# Patient Record
Sex: FEMALE | Race: WHITE | NOT HISPANIC OR LATINO | Employment: UNEMPLOYED | URBAN - NONMETROPOLITAN AREA
[De-identification: names, ages, dates, MRNs, and addresses within clinical notes are randomized per-mention and may not be internally consistent; named-entity substitution may affect disease eponyms.]

---
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
## 2022-03-07 ENCOUNTER — APPOINTMENT (OUTPATIENT)
Dept: GENERAL RADIOLOGY | Facility: OTHER | Age: 22
End: 2022-03-07
Attending: PHYSICIAN ASSISTANT
Payer: COMMERCIAL

## 2022-03-07 ENCOUNTER — HOSPITAL ENCOUNTER (EMERGENCY)
Facility: OTHER | Age: 22
Discharge: HOME | End: 2022-03-07
Attending: PHYSICIAN ASSISTANT | Admitting: PHYSICIAN ASSISTANT
Payer: COMMERCIAL

## 2022-03-07 ENCOUNTER — HOSPITAL ENCOUNTER (EMERGENCY)
Facility: OTHER | Age: 22
End: 2022-03-07

## 2022-03-07 VITALS
BODY MASS INDEX: 38.98 KG/M2 | WEIGHT: 220 LBS | HEART RATE: 96 BPM | DIASTOLIC BLOOD PRESSURE: 73 MMHG | SYSTOLIC BLOOD PRESSURE: 131 MMHG | OXYGEN SATURATION: 97 % | HEIGHT: 63 IN | RESPIRATION RATE: 21 BRPM | TEMPERATURE: 99.1 F

## 2022-03-07 DIAGNOSIS — G40.A09: ICD-10-CM

## 2022-03-07 DIAGNOSIS — R56.9 PARTIAL SEIZURE (H): ICD-10-CM

## 2022-03-07 DIAGNOSIS — R79.89 ELEVATED LFTS: ICD-10-CM

## 2022-03-07 DIAGNOSIS — F41.0 ANXIETY ATTACK: ICD-10-CM

## 2022-03-07 DIAGNOSIS — E86.0 DEHYDRATION: ICD-10-CM

## 2022-03-07 LAB
ALBUMIN SERPL-MCNC: 4.8 G/DL (ref 3.5–5.7)
ALBUMIN UR-MCNC: 70 MG/DL
ALP SERPL-CCNC: 44 U/L (ref 34–104)
ALT SERPL W P-5'-P-CCNC: 119 U/L (ref 7–52)
ANION GAP SERPL CALCULATED.3IONS-SCNC: 12 MMOL/L (ref 3–14)
APPEARANCE UR: CLEAR
AST SERPL W P-5'-P-CCNC: 55 U/L (ref 13–39)
BASOPHILS # BLD AUTO: 0.1 10E3/UL (ref 0–0.2)
BASOPHILS NFR BLD AUTO: 1 %
BILIRUB SERPL-MCNC: 0.3 MG/DL (ref 0.3–1)
BILIRUB UR QL STRIP: NEGATIVE
BUN SERPL-MCNC: 13 MG/DL (ref 7–25)
CALCIUM SERPL-MCNC: 10.4 MG/DL (ref 8.6–10.3)
CHLORIDE BLD-SCNC: 98 MMOL/L (ref 98–107)
CO2 SERPL-SCNC: 29 MMOL/L (ref 21–31)
COLOR UR AUTO: ABNORMAL
CREAT SERPL-MCNC: 0.89 MG/DL (ref 0.6–1.2)
D DIMER PPP FEU-MCNC: <=0.27 UG/ML FEU (ref 0–0.5)
EOSINOPHIL # BLD AUTO: 0.4 10E3/UL (ref 0–0.7)
EOSINOPHIL NFR BLD AUTO: 4 %
ERYTHROCYTE [DISTWIDTH] IN BLOOD BY AUTOMATED COUNT: 13.1 % (ref 10–15)
FLUAV RNA SPEC QL NAA+PROBE: NEGATIVE
FLUBV RNA RESP QL NAA+PROBE: NEGATIVE
GFR SERPL CREATININE-BSD FRML MDRD: >90 ML/MIN/1.73M2
GLUCOSE BLD-MCNC: 185 MG/DL (ref 70–105)
GLUCOSE UR STRIP-MCNC: 70 MG/DL
HCG SERPL QL: NEGATIVE
HCT VFR BLD AUTO: 43.9 % (ref 35–47)
HGB BLD-MCNC: 14.5 G/DL (ref 11.7–15.7)
HGB UR QL STRIP: NEGATIVE
IMM GRANULOCYTES # BLD: 0.2 10E3/UL
IMM GRANULOCYTES NFR BLD: 2 %
KETONES UR STRIP-MCNC: NEGATIVE MG/DL
LACTATE SERPL-SCNC: 2.5 MMOL/L (ref 0.7–2)
LEUKOCYTE ESTERASE UR QL STRIP: NEGATIVE
LIPASE SERPL-CCNC: 41 U/L (ref 11–82)
LYMPHOCYTES # BLD AUTO: 3.2 10E3/UL (ref 0.8–5.3)
LYMPHOCYTES NFR BLD AUTO: 30 %
MCH RBC QN AUTO: 29.3 PG (ref 26.5–33)
MCHC RBC AUTO-ENTMCNC: 33 G/DL (ref 31.5–36.5)
MCV RBC AUTO: 89 FL (ref 78–100)
MONOCYTES # BLD AUTO: 0.7 10E3/UL (ref 0–1.3)
MONOCYTES NFR BLD AUTO: 7 %
MUCOUS THREADS #/AREA URNS LPF: PRESENT /LPF
NEUTROPHILS # BLD AUTO: 6 10E3/UL (ref 1.6–8.3)
NEUTROPHILS NFR BLD AUTO: 56 %
NITRATE UR QL: NEGATIVE
NRBC # BLD AUTO: 0 10E3/UL
NRBC BLD AUTO-RTO: 0 /100
PH UR STRIP: 6.5 [PH] (ref 5–9)
PLATELET # BLD AUTO: 284 10E3/UL (ref 150–450)
POTASSIUM BLD-SCNC: 4.1 MMOL/L (ref 3.5–5.1)
PROCALCITONIN SERPL-MCNC: 0.93 NG/ML
PROT SERPL-MCNC: 7.8 G/DL (ref 6.4–8.9)
RBC # BLD AUTO: 4.95 10E6/UL (ref 3.8–5.2)
RBC URINE: 1 /HPF
RSV RNA SPEC NAA+PROBE: NEGATIVE
SARS-COV-2 RNA RESP QL NAA+PROBE: NEGATIVE
SODIUM SERPL-SCNC: 139 MMOL/L (ref 134–144)
SP GR UR STRIP: 1.01 (ref 1–1.03)
TROPONIN I SERPL-MCNC: 4.8 PG/ML (ref 0–34)
UROBILINOGEN UR STRIP-MCNC: NORMAL MG/DL
WBC # BLD AUTO: 10.6 10E3/UL (ref 4–11)
WBC URINE: 3 /HPF

## 2022-03-07 PROCEDURE — 96360 HYDRATION IV INFUSION INIT: CPT | Performed by: PHYSICIAN ASSISTANT

## 2022-03-07 PROCEDURE — 36415 COLL VENOUS BLD VENIPUNCTURE: CPT | Performed by: PHYSICIAN ASSISTANT

## 2022-03-07 PROCEDURE — 99283 EMERGENCY DEPT VISIT LOW MDM: CPT | Performed by: PHYSICIAN ASSISTANT

## 2022-03-07 PROCEDURE — 93005 ELECTROCARDIOGRAM TRACING: CPT | Performed by: PHYSICIAN ASSISTANT

## 2022-03-07 PROCEDURE — 84703 CHORIONIC GONADOTROPIN ASSAY: CPT | Performed by: PHYSICIAN ASSISTANT

## 2022-03-07 PROCEDURE — 80175 DRUG SCREEN QUAN LAMOTRIGINE: CPT | Performed by: PHYSICIAN ASSISTANT

## 2022-03-07 PROCEDURE — 83690 ASSAY OF LIPASE: CPT | Performed by: PHYSICIAN ASSISTANT

## 2022-03-07 PROCEDURE — 81001 URINALYSIS AUTO W/SCOPE: CPT | Performed by: PHYSICIAN ASSISTANT

## 2022-03-07 PROCEDURE — 93010 ELECTROCARDIOGRAM REPORT: CPT | Performed by: INTERNAL MEDICINE

## 2022-03-07 PROCEDURE — 99285 EMERGENCY DEPT VISIT HI MDM: CPT | Mod: 25 | Performed by: PHYSICIAN ASSISTANT

## 2022-03-07 PROCEDURE — 71045 X-RAY EXAM CHEST 1 VIEW: CPT

## 2022-03-07 PROCEDURE — 87637 SARSCOV2&INF A&B&RSV AMP PRB: CPT | Performed by: PHYSICIAN ASSISTANT

## 2022-03-07 PROCEDURE — 84484 ASSAY OF TROPONIN QUANT: CPT | Performed by: PHYSICIAN ASSISTANT

## 2022-03-07 PROCEDURE — 258N000003 HC RX IP 258 OP 636: Performed by: PHYSICIAN ASSISTANT

## 2022-03-07 PROCEDURE — 83605 ASSAY OF LACTIC ACID: CPT | Performed by: PHYSICIAN ASSISTANT

## 2022-03-07 PROCEDURE — 85025 COMPLETE CBC W/AUTO DIFF WBC: CPT | Performed by: PHYSICIAN ASSISTANT

## 2022-03-07 PROCEDURE — C9803 HOPD COVID-19 SPEC COLLECT: HCPCS | Performed by: PHYSICIAN ASSISTANT

## 2022-03-07 PROCEDURE — 80053 COMPREHEN METABOLIC PANEL: CPT | Performed by: PHYSICIAN ASSISTANT

## 2022-03-07 PROCEDURE — 84145 PROCALCITONIN (PCT): CPT | Performed by: PHYSICIAN ASSISTANT

## 2022-03-07 PROCEDURE — 85379 FIBRIN DEGRADATION QUANT: CPT | Performed by: PHYSICIAN ASSISTANT

## 2022-03-07 RX ORDER — LAMOTRIGINE 25 MG/1
25 TABLET ORAL DAILY
COMMUNITY

## 2022-03-07 RX ORDER — FLUOXETINE 40 MG/1
60 CAPSULE ORAL DAILY
COMMUNITY

## 2022-03-07 RX ORDER — SODIUM CHLORIDE 9 MG/ML
INJECTION, SOLUTION INTRAVENOUS CONTINUOUS
Status: DISCONTINUED | OUTPATIENT
Start: 2022-03-07 | End: 2022-03-07 | Stop reason: HOSPADM

## 2022-03-07 RX ADMIN — SODIUM CHLORIDE 1000 ML: 9 INJECTION, SOLUTION INTRAVENOUS at 16:48

## 2022-03-07 ASSESSMENT — ENCOUNTER SYMPTOMS
TREMORS: 0
FLANK PAIN: 0
SEIZURES: 0
NAUSEA: 0
SORE THROAT: 0
EYE PAIN: 0
CONFUSION: 1
AGITATION: 0
FEVER: 1
VOMITING: 0
FACIAL SWELLING: 0
ABDOMINAL PAIN: 0
STRIDOR: 0
WHEEZING: 0
BACK PAIN: 0
NECK PAIN: 0

## 2022-03-07 NOTE — ED TRIAGE NOTES
EMS Arrival Note  ________________________________  Verna Kauffman is a 21 year old Female that arrives via [facility]  Pre hospital clinical presentation per EMS personnel includes feels like passed out has hx of panic attacks.  Pre hospital personnel report vital signs of:  B/P 167/101; HR 70, SpO2 98  Pre Hospital Cardiac rhythm reported as Normal Sinus  Patient arrives with:  GCS Total = 15  Airway intact  Breathing Assessment Normal  Circulation Assessment Normal      Placed in room 906, gowned, warm blanket provided, side rails up,  ID verified and band placed, and call light within reach.       Previous living situation Parents/Siblings

## 2022-03-07 NOTE — ED PROVIDER NOTES
"  History     Chief Complaint   Patient presents with     Anxiety     HPI  Verna Kauffman is a 21 year old female who has a history of panic attacks in the past.  She is brought in [facility] due to an episode of decreased mental status and confusion.  Her mother reports she was just staring blankly.  The patient states she \"just does not feel right\".  She is here for further evaluation.  Denies having her COVID vaccine.  She is noted to have a slight temperature at 100.2.    Allergies:  Allergies   Allergen Reactions     Depakote [Valproic Acid]      Makes her to sedate       Problem List:    Patient Active Problem List    Diagnosis Date Noted     Intellectual disability IQ 62 07/13/2019     Priority: Medium     Neuropsychological evaluation total scale IQ 62 from 2009.       Hallucinations 07/12/2019     Priority: Medium     Auditory hallucination 10/18/2018     Priority: Medium     Major depressive disorder, single episode, severe with mood-congruent psychotic features (H) 10/18/2018     Priority: Medium     Suicidal ideation 10/18/2018     Priority: Medium     Attention deficit hyperactivity disorder (ADHD), combined type 02/26/2018     Priority: Medium     Generalized anxiety disorder 01/28/2013     Priority: Medium     Learning disability 10/18/2012     Priority: Medium     Premature birth 10/18/2012     Priority: Medium     Overview:   Born at 23 6/7 weeks gestation, weight 1 pound 1 ounce       Separation anxiety disorder 11/14/2011     Priority: Medium     Chronic rhinitis 07/23/2008     Priority: Medium        Past Medical History:    Past Medical History:   Diagnosis Date     Anxiety        Past Surgical History:    Past Surgical History:   Procedure Laterality Date     CARDIAC SURGERY  2000     HC REMOVE TONSILS/ADENOIDS,<13 Y/O  12/26/11       Family History:    Family History   Problem Relation Age of Onset     Hypertension Paternal Grandfather      Neurologic Disorder Mother         ADHD     " "Depression Other         grandmother     Neurologic Disorder Father         learning problems       Social History:  Marital Status:  Single [1]  Social History     Tobacco Use     Smoking status: Smoker, Current Status Unknown     Packs/day: 0.00     Smokeless tobacco: Never Used     Tobacco comment: vape   Substance Use Topics     Alcohol use: Yes     Drug use: Not Currently     Types: Marijuana        Medications:    cariprazine (VRAYLAR) 1.5 MG CAPS capsule  clonazePAM (KLONOPIN) 0.25 MG TBDP ODT tab  FLUoxetine (PROZAC) 40 MG capsule  lamoTRIgine (LAMICTAL) 25 MG tablet  metFORMIN (GLUCOPHAGE) 500 MG tablet          Review of Systems   Constitutional: Positive for fever.   HENT: Negative for facial swelling and sore throat.    Eyes: Negative for pain.   Respiratory: Negative for wheezing and stridor.    Cardiovascular: Negative for chest pain.   Gastrointestinal: Negative for abdominal pain, nausea and vomiting.   Genitourinary: Negative for flank pain.   Musculoskeletal: Negative for back pain and neck pain.   Skin: Negative for pallor.   Neurological: Negative for tremors and seizures.   Psychiatric/Behavioral: Positive for confusion. Negative for agitation.   All other systems reviewed and are negative.      Physical Exam   BP: (!) 158/108  Pulse: 112  Temp: 100.2  F (37.9  C)  Resp: 19  Height: 160 cm (5' 3\")  Weight: 99.8 kg (220 lb)  SpO2: 98 %    Vitals:    03/07/22 1700 03/07/22 1715 03/07/22 1730 03/07/22 1834   BP: (!) 149/74 131/73     Pulse: 98 94 96    Resp: 17 22 21    Temp:    99.1  F (37.3  C)   TempSrc:    Tympanic   SpO2: 96% 95% 97%    Weight:       Height:           Physical Exam  Vitals and nursing note reviewed.   Constitutional:       General: She is not in acute distress.     Appearance: Normal appearance. She is not ill-appearing or toxic-appearing.   HENT:      Head: Normocephalic. No raccoon eyes, right periorbital erythema or left periorbital erythema.      Right Ear: No drainage or " tenderness.      Left Ear: No drainage or tenderness.      Nose: Nose normal.   Eyes:      General: Lids are normal. Gaze aligned appropriately. No scleral icterus.     Extraocular Movements: Extraocular movements intact.   Neck:      Trachea: No tracheal deviation.   Cardiovascular:      Rate and Rhythm: Normal rate.   Pulmonary:      Effort: Pulmonary effort is normal. No respiratory distress.      Breath sounds: No stridor.      Comments: Lung sounds are clear SaO2 is 98% on room air.  She does not appear to be in respiratory distress.  No tachypnea.  Abdominal:      Tenderness: There is no abdominal tenderness.   Musculoskeletal:         General: No deformity or signs of injury. Normal range of motion.      Cervical back: Normal range of motion. No signs of trauma.   Skin:     General: Skin is warm and dry.      Coloration: Skin is not jaundiced or pale.   Neurological:      General: No focal deficit present.      Mental Status: She is alert and oriented to person, place, and time.      GCS: GCS eye subscore is 4. GCS verbal subscore is 5. GCS motor subscore is 6.      Motor: No tremor or seizure activity.   Psychiatric:         Attention and Perception: Attention normal.         Mood and Affect: Mood normal.         ED Course     EKG shows sinus tachycardia with a heart rate of 105.  The Lactic acid level is elevated due to partial siezure, at this time there is no sign of severe sepsis or septic shock.         Results for orders placed or performed during the hospital encounter of 03/07/22 (from the past 24 hour(s))   CBC with platelets differential    Narrative    The following orders were created for panel order CBC with platelets differential.  Procedure                               Abnormality         Status                     ---------                               -----------         ------                     CBC with platelets and d...[[id number]]                      Final result                  Please view results for these tests on the individual orders.   D dimer quantitative   Result Value Ref Range    D-Dimer Quantitative <=0.27 0.00 - 0.50 ug/mL FEU    Narrative    This D-dimer assay is intended for use in conjunction with a clinical pretest probability assessment model to exclude pulmonary embolism (PE) and deep venous thrombosis (DVT) in outpatients suspected of PE or DVT. The cut-off value is 0.50 ug/mL FEU.   Comprehensive metabolic panel   Result Value Ref Range    Sodium 139 134 - 144 mmol/L    Potassium 4.1 3.5 - 5.1 mmol/L    Chloride 98 98 - 107 mmol/L    Carbon Dioxide (CO2) 29 21 - 31 mmol/L    Anion Gap 12 3 - 14 mmol/L    Urea Nitrogen 13 7 - 25 mg/dL    Creatinine 0.89 0.60 - 1.20 mg/dL    Calcium 10.4 (H) 8.6 - 10.3 mg/dL    Glucose 185 (H) 70 - 105 mg/dL    Alkaline Phosphatase 44 34 - 104 U/L    AST 55 (H) 13 - 39 U/L     (H) 7 - 52 U/L    Protein Total 7.8 6.4 - 8.9 g/dL    Albumin 4.8 3.5 - 5.7 g/dL    Bilirubin Total 0.3 0.3 - 1.0 mg/dL    GFR Estimate >90 >60 mL/min/1.73m2   Lactic acid whole blood   Result Value Ref Range    Lactic Acid 2.5 (H) 0.7 - 2.0 mmol/L   Lipase   Result Value Ref Range    Lipase 41 11 - 82 U/L   Troponin I   Result Value Ref Range    Troponin I 4.8 0.0 - 34.0 pg/mL   CBC with platelets and differential   Result Value Ref Range    WBC Count 10.6 4.0 - 11.0 10e3/uL    RBC Count 4.95 3.80 - 5.20 10e6/uL    Hemoglobin 14.5 11.7 - 15.7 g/dL    Hematocrit 43.9 35.0 - 47.0 %    MCV 89 78 - 100 fL    MCH 29.3 26.5 - 33.0 pg    MCHC 33.0 31.5 - 36.5 g/dL    RDW 13.1 10.0 - 15.0 %    Platelet Count 284 150 - 450 10e3/uL    % Neutrophils 56 %    % Lymphocytes 30 %    % Monocytes 7 %    % Eosinophils 4 %    % Basophils 1 %    % Immature Granulocytes 2 %    NRBCs per 100 WBC 0 <1 /100    Absolute Neutrophils 6.0 1.6 - 8.3 10e3/uL    Absolute Lymphocytes 3.2 0.8 - 5.3 10e3/uL    Absolute Monocytes 0.7 0.0 - 1.3 10e3/uL    Absolute Eosinophils 0.4 0.0 - 0.7  10e3/uL    Absolute Basophils 0.1 0.0 - 0.2 10e3/uL    Absolute Immature Granulocytes 0.2 <=0.4 10e3/uL    Absolute NRBCs 0.0 10e3/uL   Procalcitonin   Result Value Ref Range    Procalcitonin 0.93 <0.50 ng/mL ng/mL   HCG qualitative Blood   Result Value Ref Range    hCG Serum Qualitative Negative Negative   Asymptomatic Influenza A/B & SARS-CoV2 (COVID-19) Virus PCR Multiplex Nose    Specimen: Nose; Swab   Result Value Ref Range    Influenza A PCR Negative Negative    Influenza B PCR Negative Negative    RSV PCR Negative Negative    SARS CoV2 PCR Negative Negative    Narrative    Testing was performed using the Xpert Xpress CoV2/Flu/RSV Assay on the Cepheid GeneXpert Instrument. This test should be ordered for the detection of SARS-CoV-2 and influenza viruses in individuals who meet clinical and/or epidemiological criteria. Test performance is unknown in asymptomatic patients. This test is for in vitro diagnostic use under the FDA EUA for laboratories certified under CLIA to perform high or moderate complexity testing. This test has not been FDA cleared or approved. A negative result does not rule out the presence of PCR inhibitors in the specimen or target RNA in concentration below the limit of detection for the assay. If only one viral target is positive but coinfection with multiple targets is suspected, the sample should be re-tested with another FDA cleared, approved, or authorized test, if coinfection would change clinical management. This test was validated by the [facility] [employer]. These laboratories are certified under the Clinical  Laboratory Improvement Amendments of 1988 (CLIA-88) as qualified to perform high complexity laboratory testing.   XR Chest Port 1 View    Narrative    Procedure:XR CHEST PORT 1 VIEW    Clinical history:Female, 21 years, fever    Technique: Single view was obtained.    Comparison: 2/14/2021    Findings: The cardiac silhouette is normal. The pulmonary vasculature  is  normal.    The lungs are clear. Bony structures are unremarkable.      Impression    Impression:   No acute abnormality. No evidence of acute or active cardiopulmonary  disease.    JOVITA BURDICK MD         SYSTEM ID:  [id number]   UA with Microscopic reflex to Culture    Specimen: Urine, Clean Catch   Result Value Ref Range    Color Urine Light Yellow Colorless, Straw, Light Yellow, Yellow    Appearance Urine Clear Clear    Glucose Urine 70  (A) Negative mg/dL    Bilirubin Urine Negative Negative    Ketones Urine Negative Negative mg/dL    Specific Gravity Urine 1.014 1.000 - 1.030    Blood Urine Negative Negative    pH Urine 6.5 5.0 - 9.0    Protein Albumin Urine 70  (A) Negative mg/dL    Urobilinogen Urine Normal Normal, 2.0 mg/dL    Nitrite Urine Negative Negative    Leukocyte Esterase Urine Negative Negative    Mucus Urine Present (A) None Seen /LPF    RBC Urine 1 <=2 /HPF    WBC Urine 3 <=5 /HPF    Narrative    Urine Culture not indicated       Medications   0.9% sodium chloride BOLUS (0 mLs Intravenous Stopped 3/7/22 1746)     Followed by   sodium chloride 0.9% infusion (has no administration in time range)       Assessments & Plan (with Medical Decision Making)     I have reviewed the nursing notes.    I have reviewed the findings, diagnosis, plan and need for follow up with the patient.      Discharge Medication List as of 3/7/2022  6:25 PM          Final diagnoses:   Petit mal, typical (H)   Partial seizure (H)   Dehydration   Elevated LFTs   Anxiety attack     Afebrile.  Vital signs stable.  Patient with an episode of blank staring and confusion.  Reports she just did not feel herself.  IV established and she was given fluids. EKG shows sinus tachycardia with a heart rate of 105.  Troponin is normal.  D-dimer is normal.  CBC shows normal white blood cells no left shift.  The patient's hemoglobin is 14.5.  CMP shows elevated ALT of 119, AST is 55 but alk phos remains normal at 44.  Lipase is normal.  Her  UA shows no signs of UTI.  hCG is negative.  Procalcitonin is elevated as well at 0.93.  Lactic acid is slightly elevated 2.5.  Patient's Lamictal level is pending.  The patient is feeling much better with better treatment.  Given this patient's elevated lactic acid as well as procalcitonin no other reasoning for these elevations most likely a petit mall seizure or partial seizure.  Furthermore she may have been slightly dehydrated.  I discussed increase fluid intake and continued monitoring she feels ready to return home.  The patient and her mother report being happy with the care they received today.  Follow-up if there is any concerns for further evaluation as needed.  I discussed this case with Dr. Dickens who agrees with the evaluation, diagnosis and treatment of this patient.  3/7/2022   [facility] [place]     Ronaldo Castillo PA-C  03/07/22 2028       Ronaldo Castillo PA-C  03/07/22 2049

## 2022-03-08 LAB
ATRIAL RATE - MUSE: 105 BPM
DIASTOLIC BLOOD PRESSURE - MUSE: NORMAL MMHG
INTERPRETATION ECG - MUSE: NORMAL
P AXIS - MUSE: 51 DEGREES
PR INTERVAL - MUSE: 142 MS
QRS DURATION - MUSE: 92 MS
QT - MUSE: 346 MS
QTC - MUSE: 457 MS
R AXIS - MUSE: 6 DEGREES
SYSTOLIC BLOOD PRESSURE - MUSE: NORMAL MMHG
T AXIS - MUSE: 22 DEGREES
VENTRICULAR RATE- MUSE: 105 BPM

## 2022-03-11 ENCOUNTER — TELEPHONE (OUTPATIENT)
Dept: EMERGENCY MEDICINE | Facility: OTHER | Age: 22
End: 2022-03-11
Payer: COMMERCIAL

## 2022-03-11 LAB — LAMOTRIGINE SERPL-MCNC: <0.9 UG/ML

## 2022-03-11 NOTE — TELEPHONE ENCOUNTER
[facility] () Emergency Department/Urgent Care Lab result notification:    Reason for call  Notify of lab results, assess symptoms,  review ED providers recommendations (if necessary) and advise per ED lab result f/u protocol.    Lab result  Final Lamotrigine (Lamictal) level is <0.9 ug/mL and this level is [LOW ]. Normal reference ranged for Lamotrigine (Lamictal) is 3.0 to 15.0 ug/mL  Resulted after [facility] Emergency Dept visit on 3/7/22 (date).  Patient to be notified of result and advised to relay result to their Neurologist or physician who manages the medication dosing.    Left voicemail message requesting a call back to [phone] between 9 a.m. and 5:30 p.m. for patient's ED/UC lab results.      Eve Washington RN  Customer Service Center Result RN  [facility] Emergency Dept Lab Result RN  # [phone]

## 2022-03-12 NOTE — TELEPHONE ENCOUNTER
[facility] () Emergency Department/Urgent Care Lab result notification:     Reason for call  Notify of lab results, assess symptoms,  review ED providers recommendations (if necessary) and advise per ED lab result f/u protocol.     Lab result  Final Lamotrigine (Lamictal) level is <0.9 ug/mL and this level is [LOW ]. Normal reference ranged for Lamotrigine (Lamictal) is 3.0 to 15.0 ug/mL  Resulted after [facility] Emergency Dept visit on 3/7/22 (date).  Patient to be notified of result and advised to relay result to their Neurologist or physician who manages the medication dosing.         1:30 Relayed result to patient and she will contact her neurologist    Leidy Akbar  [facility]  Emergency Dept Lab Result RN  # [phone]

## 2022-09-04 ENCOUNTER — OFFICE VISIT (OUTPATIENT)
Dept: FAMILY MEDICINE | Facility: OTHER | Age: 22
End: 2022-09-04
Attending: FAMILY MEDICINE
Payer: COMMERCIAL

## 2022-09-04 VITALS
WEIGHT: 228.19 LBS | OXYGEN SATURATION: 98 % | DIASTOLIC BLOOD PRESSURE: 76 MMHG | RESPIRATION RATE: 18 BRPM | BODY MASS INDEX: 40.42 KG/M2 | HEART RATE: 99 BPM | SYSTOLIC BLOOD PRESSURE: 134 MMHG | TEMPERATURE: 97.2 F

## 2022-09-04 DIAGNOSIS — F31.9 BIPOLAR DISORDER WITH PSYCHOTIC FEATURES (H): ICD-10-CM

## 2022-09-04 DIAGNOSIS — G47.00 INSOMNIA, UNSPECIFIED TYPE: Primary | ICD-10-CM

## 2022-09-04 PROCEDURE — 99203 OFFICE O/P NEW LOW 30 MIN: CPT | Performed by: FAMILY MEDICINE

## 2022-09-04 RX ORDER — UREA 20 %
CREAM (GRAM) TOPICAL
COMMUNITY
Start: 2022-08-01

## 2022-09-04 RX ORDER — TRAZODONE HYDROCHLORIDE 50 MG/1
TABLET, FILM COATED ORAL
COMMUNITY
Start: 2022-08-31 | End: 2022-09-04

## 2022-09-04 RX ORDER — CARIPRAZINE 4.5 MG/1
CAPSULE, GELATIN COATED ORAL
COMMUNITY
Start: 2022-09-01

## 2022-09-04 RX ORDER — EXENATIDE 2 MG/.85ML
INJECTION, SUSPENSION, EXTENDED RELEASE SUBCUTANEOUS
COMMUNITY
Start: 2022-08-01

## 2022-09-04 RX ORDER — LORAZEPAM 2 MG/1
2 TABLET ORAL EVERY 6 HOURS PRN
COMMUNITY
Start: 2022-09-04

## 2022-09-04 ASSESSMENT — PAIN SCALES - GENERAL: PAINLEVEL: NO PAIN (0)

## 2022-09-04 NOTE — NURSING NOTE
"Patient presents to clinic with her mother experiencing insomnia, trouble falling asleep and jerking body movements while sleeping.  She started Trazodone 50mg 3 days ago andstates, \"I hear voices, feel funny and have weird thoughts.\"    Medication Rec Complete  Diamante Castillo LPN............9/4/2022 9:48 AM    "

## 2022-09-04 NOTE — PROGRESS NOTES
"  Assessment & Plan       ICD-10-CM    1. Insomnia, unspecified type  G47.00    2. Bipolar disorder with psychotic features (H)  F31.9      Stop trazodone. Use ativan tonight and tomorrow night before bed (okay to take up to 2 2mg tabs). Follow up with psychiatric medication provider on 9/6/22 to discuss plan.        BMI:   Estimated body mass index is 40.42 kg/m  as calculated from the following:    Height as of 3/7/22: 1.6 m (5' 3\").    Weight as of this encounter: 103.5 kg (228 lb 3 oz).       No follow-ups on file.    [facility] NURSE  [facility]    Angelica Gillespie is a 22 year old, presenting for the following health issues:  Insomnia (Trouble falling asleep, awake all night, weird thoughts, jerking extremities )      HPI       Patient presents with worsening insomnia since starting trazadone. Seemed to induce a manic episode with involuntary movements. Hx of psychosis, hearing voices.     Took ativan 4 mg last night about 2am.     On prozac x 1 year.   Lamictal for years.   Ativan x years.   Vraylar x 4-5y    trazadone 50mg for the past 3 nights  Not sleeping manic, \"scary and goofy\"            Objective    /76 (BP Location: Right arm, Patient Position: Sitting, Cuff Size: Adult Large)   Pulse 99   Temp 97.2  F (36.2  C) (Tympanic)   Resp 18   Wt 103.5 kg (228 lb 3 oz)   LMP  (LMP Unknown)   SpO2 98%   Breastfeeding No   BMI 40.42 kg/m    Body mass index is 40.42 kg/m .  Physical Exam  Constitutional:       Appearance: She is well-developed.   HENT:      Right Ear: External ear normal.      Left Ear: External ear normal.   Eyes:      General: No scleral icterus.     Conjunctiva/sclera: Conjunctivae normal.   Cardiovascular:      Rate and Rhythm: Normal rate.   Pulmonary:      Effort: Pulmonary effort is normal. No respiratory distress.   Skin:     Findings: No rash.   Neurological:      Mental Status: She is alert.            "

## 2022-09-12 ENCOUNTER — HOSPITAL ENCOUNTER (EMERGENCY)
Facility: OTHER | Age: 22
Discharge: HOME | End: 2022-09-12
Attending: FAMILY MEDICINE | Admitting: FAMILY MEDICINE
Payer: COMMERCIAL

## 2022-09-12 VITALS
RESPIRATION RATE: 16 BRPM | TEMPERATURE: 97.2 F | WEIGHT: 228 LBS | BODY MASS INDEX: 40.4 KG/M2 | DIASTOLIC BLOOD PRESSURE: 102 MMHG | OXYGEN SATURATION: 96 % | SYSTOLIC BLOOD PRESSURE: 158 MMHG | HEIGHT: 63 IN | HEART RATE: 120 BPM

## 2022-09-12 DIAGNOSIS — R44.0 AUDITORY HALLUCINATION: ICD-10-CM

## 2022-09-12 DIAGNOSIS — F32.3 MAJOR DEPRESSIVE DISORDER, SINGLE EPISODE, SEVERE WITH MOOD-CONGRUENT PSYCHOTIC FEATURES (H): ICD-10-CM

## 2022-09-12 LAB
ALBUMIN UR-MCNC: 100 MG/DL
AMPHETAMINES UR QL: NOT DETECTED
ANION GAP SERPL CALCULATED.3IONS-SCNC: 9 MMOL/L (ref 3–14)
APAP SERPL-MCNC: <2 MG/L (ref 10–30)
APPEARANCE UR: CLEAR
BARBITURATES UR QL SCN: NOT DETECTED
BASOPHILS # BLD AUTO: 0.1 10E3/UL (ref 0–0.2)
BASOPHILS NFR BLD AUTO: 1 %
BENZODIAZ UR QL SCN: NOT DETECTED
BILIRUB UR QL STRIP: NEGATIVE
BUN SERPL-MCNC: 14 MG/DL (ref 7–25)
BUPRENORPHINE UR QL: NOT DETECTED
CALCIUM SERPL-MCNC: 10.1 MG/DL (ref 8.6–10.3)
CANNABINOIDS UR QL: NOT DETECTED
CHLORIDE BLD-SCNC: 99 MMOL/L (ref 98–107)
CO2 SERPL-SCNC: 25 MMOL/L (ref 21–31)
COCAINE UR QL SCN: NOT DETECTED
COLOR UR AUTO: YELLOW
CREAT SERPL-MCNC: 0.85 MG/DL (ref 0.6–1.2)
D-METHAMPHET UR QL: NOT DETECTED
EOSINOPHIL # BLD AUTO: 0.6 10E3/UL (ref 0–0.7)
EOSINOPHIL NFR BLD AUTO: 5 %
ERYTHROCYTE [DISTWIDTH] IN BLOOD BY AUTOMATED COUNT: 12.8 % (ref 10–15)
ETHANOL SERPL-MCNC: <0.01 G/DL
FLUAV RNA SPEC QL NAA+PROBE: NEGATIVE
FLUBV RNA RESP QL NAA+PROBE: NEGATIVE
GFR SERPL CREATININE-BSD FRML MDRD: >90 ML/MIN/1.73M2
GLUCOSE BLD-MCNC: 212 MG/DL (ref 70–105)
GLUCOSE UR STRIP-MCNC: 150 MG/DL
HCG UR QL: NEGATIVE
HCT VFR BLD AUTO: 42.2 % (ref 35–47)
HGB BLD-MCNC: 14.4 G/DL (ref 11.7–15.7)
HGB UR QL STRIP: NEGATIVE
HOLD SPECIMEN: NORMAL
IMM GRANULOCYTES # BLD: 0.1 10E3/UL
IMM GRANULOCYTES NFR BLD: 1 %
KETONES UR STRIP-MCNC: NEGATIVE MG/DL
LEUKOCYTE ESTERASE UR QL STRIP: NEGATIVE
LYMPHOCYTES # BLD AUTO: 4.4 10E3/UL (ref 0.8–5.3)
LYMPHOCYTES NFR BLD AUTO: 38 %
MCH RBC QN AUTO: 28.6 PG (ref 26.5–33)
MCHC RBC AUTO-ENTMCNC: 34.1 G/DL (ref 31.5–36.5)
MCV RBC AUTO: 84 FL (ref 78–100)
METHADONE UR QL SCN: NOT DETECTED
MONOCYTES # BLD AUTO: 0.8 10E3/UL (ref 0–1.3)
MONOCYTES NFR BLD AUTO: 7 %
MUCOUS THREADS #/AREA URNS LPF: PRESENT /LPF
NEUTROPHILS # BLD AUTO: 5.5 10E3/UL (ref 1.6–8.3)
NEUTROPHILS NFR BLD AUTO: 48 %
NITRATE UR QL: NEGATIVE
NRBC # BLD AUTO: 0 10E3/UL
NRBC BLD AUTO-RTO: 0 /100
OPIATES UR QL SCN: NOT DETECTED
OXYCODONE UR QL SCN: NOT DETECTED
PCP UR QL SCN: NOT DETECTED
PH UR STRIP: 6.5 [PH] (ref 5–9)
PLATELET # BLD AUTO: 255 10E3/UL (ref 150–450)
POTASSIUM BLD-SCNC: 3.7 MMOL/L (ref 3.5–5.1)
PROPOXYPH UR QL: NOT DETECTED
RBC # BLD AUTO: 5.04 10E6/UL (ref 3.8–5.2)
RBC URINE: <1 /HPF
RSV RNA SPEC NAA+PROBE: NEGATIVE
SALICYLATES SERPL-MCNC: <0 MG/DL (ref 15–30)
SARS-COV-2 RNA RESP QL NAA+PROBE: NEGATIVE
SODIUM SERPL-SCNC: 133 MMOL/L (ref 134–144)
SP GR UR STRIP: 1.03 (ref 1–1.03)
TRICYCLICS UR QL SCN: NOT DETECTED
UROBILINOGEN UR STRIP-MCNC: NORMAL MG/DL
WBC # BLD AUTO: 11.6 10E3/UL (ref 4–11)
WBC URINE: 2 /HPF

## 2022-09-12 PROCEDURE — 99285 EMERGENCY DEPT VISIT HI MDM: CPT | Mod: 25 | Performed by: FAMILY MEDICINE

## 2022-09-12 PROCEDURE — 80306 DRUG TEST PRSMV INSTRMNT: CPT | Performed by: FAMILY MEDICINE

## 2022-09-12 PROCEDURE — 85025 COMPLETE CBC W/AUTO DIFF WBC: CPT | Performed by: FAMILY MEDICINE

## 2022-09-12 PROCEDURE — 99284 EMERGENCY DEPT VISIT MOD MDM: CPT | Performed by: FAMILY MEDICINE

## 2022-09-12 PROCEDURE — 81025 URINE PREGNANCY TEST: CPT | Performed by: FAMILY MEDICINE

## 2022-09-12 PROCEDURE — C9803 HOPD COVID-19 SPEC COLLECT: HCPCS | Performed by: FAMILY MEDICINE

## 2022-09-12 PROCEDURE — 82077 ASSAY SPEC XCP UR&BREATH IA: CPT | Performed by: FAMILY MEDICINE

## 2022-09-12 PROCEDURE — 80048 BASIC METABOLIC PNL TOTAL CA: CPT | Performed by: FAMILY MEDICINE

## 2022-09-12 PROCEDURE — 80179 DRUG ASSAY SALICYLATE: CPT | Performed by: FAMILY MEDICINE

## 2022-09-12 PROCEDURE — 81001 URINALYSIS AUTO W/SCOPE: CPT | Performed by: FAMILY MEDICINE

## 2022-09-12 PROCEDURE — 87637 SARSCOV2&INF A&B&RSV AMP PRB: CPT | Performed by: FAMILY MEDICINE

## 2022-09-12 PROCEDURE — 80143 DRUG ASSAY ACETAMINOPHEN: CPT | Performed by: FAMILY MEDICINE

## 2022-09-12 PROCEDURE — 36415 COLL VENOUS BLD VENIPUNCTURE: CPT | Performed by: FAMILY MEDICINE

## 2022-09-12 ASSESSMENT — ACTIVITIES OF DAILY LIVING (ADL)
ADLS_ACUITY_SCORE: 35
ADLS_ACUITY_SCORE: 35

## 2022-09-12 ASSESSMENT — ENCOUNTER SYMPTOMS
NERVOUS/ANXIOUS: 1
HALLUCINATIONS: 1

## 2022-09-12 NOTE — DISCHARGE INSTRUCTIONS
"Verna and her mom prefer the \"home/ [place] wait list\" plan    Dr Garcia    Aftercare Plan  If I am feeling unsafe or I am in a crisis, I will:   Contact my established care providers   Call the National Suicide Prevention Lifeline: 988  Go to the nearest emergency room   Call 911     Warning signs that I or other people might notice when a crisis is developing for me:   -if I am experiencing increased suicidal thoughts that turn into having intent, plans or means   -if engaging in risky activities without thinking things through   -if experiencing increased depression, anxiety, and/or mood dysregulation/swings that impact ability to function   -if withdrawing from supportive family members     Things I am able to do on my own to cope or help me feel better:   -coloring   -listening to music  -visiting or talking to grandmother on phone   -using fidgets   -going on a walk    Things that I am able to do with others to cope or help me better:   -let supportive family members and professional provider team know if experiencing increased anxiety, depression, mood swings, and/or behavioral changes   -let others know how they can provide support, such as if to just be a \"listening ear\" or if seeking advice/resources, etc.      Things I can use or do for distraction:   -watch favorite television shows or movies of interest   -color   -listen to music   -talk to Grandmother   -pet dog, \"Bear\"   -use fidgets     Changes I can make to support my mental health and wellness:   -commit to 30 minutes of self care daily-take a shower, go for a walk, read, write, color, etc.   -use distraction skills (ideas listed above)   -repeat positive self affirmations and post in visible areas   -practice deep breathing,   -use county resources through [facility], support groups, etc.   -maintain a daily schedule and routine when possible   -try to have consistent wake/sleep times   -download an jonatan that focuses on improving mental health " "such as Calm or Headspace (many others available)    People in my life that I can ask for help:   -mother and grandmother   -therapist  -medication management provider, Gayatri Newsome  -[facility]   -[facility] ([facility])     Your [facility] has a mental health crisis team you can call 24/7: [facility] Mobile Crisis Team ([facility]) [phone]     Other things that are important when I'm in crisis: call or text a crisis line, call 911, notify mother, go to the nearest emergency room.     Additional resources and information:   Recommend to discharge to residential crisis treatment facility. You are accepted and on the waitlist at [facility] in [place] and there is immediate openings at [facility]. Contact [facility] at [phone] to update and to assist with facilitating.Recommend to follow up with therapist and attend appointment scheduled on Wednesday (if not yet in residential program) and to follow up with medication management provider, Gayatri Newsome through [facility] for soonest available appointment. Recommend to return to the emergency room if symptoms worsen.       Crisis Lines  Crisis Text Line  Text 005396  You will be connected with a trained live crisis counselor to provide support.    Por espanol, texto  MENDOZA a 296929 o texto a 442-AYUDAME en WhatsApp    [employer] (LGBTQ Youth Crisis Line)  0.206.225.0166  text START to 752-793      Community Resources  Fast Tracker  Linking people to mental health and substance use disorder resources  fasttrackermn.org     [place] Mental [facility] Warm Line  Peer to peer support  Monday thru Saturday, 12 pm to 10 pm  696.470.9741 or 8.569.387.2075  Text \"Support\" to 48036    National [employer] Mental [employer]ness (ESTUARDO)  765.807.8566 or 1.888.ESTUARDO.HELPS      Mental Health Apps  My3  https://myPublonspp.org/    VirtualHopeBox  https://Socialeyes App.org/apps/virtual-hope-box/      Additional Information  Today you were seen " by a licensed mental health professional through Triage and Transition services, [facility] ([facility])  for a crisis assessment in the Emergency Department at [facility].  It is recommended that you follow up with your established providers (psychiatrist, mental health therapist, and/or primary care doctor - as relevant) as soon as possible. Coordinators from [facility] will be calling you in the next 24-48 hours to ensure that you have the resources you need.  You can also contact [facility] coordinators directly at [phone]. You may have been scheduled for or offered an appointment with a mental health provider. [facility] maintains an extensive network of licensed behavioral health providers to connect patients with the services they need.  We do not charge providers a fee to participate in our referral network.  We match patients with providers based on a patient's specific needs, insurance coverage, and location.  Our first effort will be to refer you to a provider within your care system, and will utilize providers outside your care system as needed.

## 2022-09-12 NOTE — ED TRIAGE NOTES
Pt arrives with history of having an increase in mental health medication and stating that she is hearing voices, having suicidal ideation, and states she does have a plan, is uncomfortable talking about it with writer. Writer reviewed Dec assessment with pt and encouraged pt to be open with them, and that it is okay to not discuss plan with writer. Pt is calm and cooperative in triage.      Triage Assessment     Row Name 09/12/22 8996       Triage Assessment (Adult)    Airway WDL WDL       Respiratory WDL    Respiratory WDL WDL       Skin Circulation/Temperature WDL    Skin Circulation/Temperature WDL WDL       Cardiac WDL    Cardiac WDL WDL       Peripheral/Neurovascular WDL    Peripheral Neurovascular WDL WDL       Cognitive/Neuro/Behavioral WDL    Cognitive/Neuro/Behavioral WDL WDL

## 2022-09-12 NOTE — ED NOTES
"Mom in room with patient.  She hasn't eaten for awhile.  Ordering lunch for patient.  She states that she has had an acute increase of suicidal thoughts without specific plan.  Her mom reports that yesterday she had a sweatshirt wrapped around her neck, but that she didn't \"attempt it\".  Suicidal thoughts are constant and worsening.  Unable to control them.  Has been to several inpatient units in the past.  "

## 2022-09-12 NOTE — CONSULTS
Diagnostic Evaluation Consultation  Crisis Assessment    Patient was assessed: Natasha  Patient location: [facility] ED  Was a release of information signed: Yes. Providers included on the release: [facility]      Referral Data and Chief Complaint  Verna Kauffman is a 22 year old, who uses she/her pronouns, and presents to the ED with family/friends. Patient is referred to the ED by family/friends. Patient is presenting to the ED for the following concerns: evaluation of mental health and suicidal ideation.    Informed Consent and Assessment Methods     Patient is under the guardianship of Shanice Kauffman (mother), [phone].  Writer met with patient and spoke with guardian  and explained the crisis assessment process, including applicable information disclosures and limits to confidentiality, assessed understanding of the process, and obtained consent to proceed with the assessment. Patient was observed to be able to participate in the assessment as evidenced by written and verbal consent . Assessment methods included conducting a formal interview with patient, review of medical records, collaboration with medical staff, and obtaining relevant collateral information from family and community providers when available..     Over the course of this crisis assessment provided reassurance, offered validation, engaged patient in problem solving and disposition planning, worked with patient on safety and aftercare planning, provided psychoeducation and facilitated family communication. Patient's response to interventions was receptive and engaged     Summary of Patient Situation    Verna Kauffman arrived at the ED accompanied with her mother/legal guardian, Shanice Kauffman, due to pt experiencing an increase in anxiety, depression, impulsive behaviors and suicidal ideation. Pt has a hx of being diagnosed with AMANDA, MDD, ADHD, intellectual disability and auditory hallucination  "symptoms. The pt has been working closely with [facility] mobile crisis team and she was recently referred and spent one day at [facility], a crisis residential facility last Thursday. Pt reports of having a hx of separation anxiety and called her mother to pick her up from the facility. Pt lives with her mother and her she reports pt has been struggling with her mental health for a while. Pt recently had some medication changes and her Vraylar dosage was increased in addition to starting Trazodone for her insomnia after seeing her psychiatric medication management provider through [facility] a couple weeks ago. Pt reports that the increased dosage of Vraylar has seemed to help decrease her auditory hallucination symptoms and that she just hears \"whispers\" instead of voices. Pt denies experiencing any command auditory hallucinations. Pt's mother reports that the Trazodone medication seemed to have the reverse side effect of keeping her awake more often. Pt's mother also noted of pt being given Ativan and of displaying several odd behaviors of pulling down her pants and experiencing other dissociation-like symptoms. Pt has been waking her mother up more frequently and her mother reported that pt was in her closet and tried to tighten her sweatshirt around her neck yesterday. Pt denied of this being a SI attempt. Pt's mother also reports concerns of pt texting men (that her mother knows) inappropriate sexual things. Pt has a therapist whom she sees weekly and has an upcoming appt with her on Wednesday.     While in the ED, pt reported of experiencing increased anxiety and suicidal ideation as the reasoning for her coming to the ED. Pt reports of continuing to experience auditory hallucinations, but that this has been going on for a long time and that her medication change was helping to reduce her hallucination symptoms. Pt denied experiencing any SI intent and/or plan. Pt denied experiencing any " thoughts of harming others. Pt reported she felt some of her increased SI and anxiety symptoms were related to stressors going on recently that she preferred not to talk about, but that her mother was aware and could provide further details. Pt identified several coping strategies that has helped to reduce her symptoms, including coloring, talking to her grandma, petting her dog, Bear, and listening to music. After further discussing pt's treatment options/preferences, pt reported she felt safe to discharge and talked more with her mother about wanting to go back to [facility] now that she knows more of what to expect after having went there last week. Pt also reported of wanting to follow up with her therapist to further discuss recent stressors going on. Pt expressed preference of not wanting hospitalization due to feeling it would increase her anxiety.     Interviewed pt's and she provided more detailed information about pt's hx and recent symptoms. She expressed having some concerns about her behaviors, but that she felt she could keep pt safe at home due to working from home and also coordinating care with her grandmother whom pt identified as being very close to. Pt's mother reports she spoke with Amrita from [facility] earlier today to inquire about pt returning to [facility] and was informed that pt is accepted but they are currently full, hence she is currently on the waitlist. Pt's mother expressed treatment preference for pt to return to [facility] when they have an opening and to follow up with her therapist and psychiatrist.     Brief Psychosocial History     Pt lives with her mother and dog, Bear. Pt lives close to her grandmother's home and identified both her mother and grandmother as being very supportive. Pt is not working and she receives waiverservices through the [facility].     Significant Clinical History    Pt has a hx of being diagnosed with MDD, AMANDA, ADHD, and intelluctual disability. Pt  has a hx of experiencing auditory hallucinations. Pt was last hospitalized in 2018 at [facility]. Pt has an established outpatient therapist whom she sees weekly and a psychiatric nurse provider whom manages her psych meds. Pt used to have a county , but is in process of starting case management services again. Pt is on a waitlist for [facility] services and it is predicted to start soon. [facility] has provided additional support and close follow up of pt's mental health service needs.      Collateral Information  Reviewed pt's chart in Epic, interviewed pt's mother/guardian, Shanice Kauffman [phone] via phone while in the ED, called [facility] and spoke to      Risk Assessment  ESS-6  1.a. Over the past 2 weeks, have you had thoughts of killing yourself? Yes  1.b. Have you ever attempted to kill yourself and, if yes, when did this last happen? No   2. Recent or current suicide plan? No   3. Recent or current intent to act on ideation? No  4. Lifetime psychiatric hospitalization? Yes  5. Pattern of excessive substance use? No  6. Current irritability, agitation, or aggression? No  Scoring note: BOTH 1a and 1b must be yes for it to score 1 point, if both are not yes it is zero. All others are 1 point per number. If all questions 1a/1b - 6 are no, risk is negligible. If one of 1a/1b is yes, then risk is mild. If either question 2 or 3, but not both, is yes, then risk is automatically moderate regardless of total score. If both 2 and 3 are yes, risk is automatically high regardless of total score.      Score: 2, mild risk      Does the patient have access to lethal means? No     Does the patient engage in non-suicidal self-injurious behavior (NSSI/SIB)? no     Does the patient have thoughts of harming others? No     Is the patient engaging in sexually inappropriate behavior?  yes pt has been texting sexually inappropriate things to men her mother knows.        Current Substance Abuse     Is there recent substance  abuse? no     Was a urine drug screen or blood alcohol level obtained: Yes positive for Benzodiazepines       Mental Status Exam     Affect: Appropriate   Appearance: Appropriate    Attention Span/Concentration: Attentive  Eye Contact: Engaged   Fund of Knowledge: Appropriate    Language /Speech Content: Fluent   Language /Speech Volume: Normal    Language /Speech Rate/Productions: Normal    Recent Memory: Intact   Remote Memory: Intact   Mood: Anxious and Normal    Orientation to Person: Yes    Orientation to Place: Yes   Orientation to Time of Day: Yes    Orientation to Date: Yes    Situation (Do they understand why they are here?): Yes    Psychomotor Behavior: Normal    Thought Content: Clear   Thought Form: Intact      History of commitment: No       Medication    Psychotropic medications: Yes. Pt is currently taking Trazadone, Metronidazole, Vraylar. Medication compliant: Yes. Recent medication changes: Yes increased Vraylar dosage and added trazodone at last appt with psychiatric nurse a couple weeks ago  Medication changes made in the last two weeks: Yes: see above       Current Care Team    Primary Care Provider: Yes. Name: [employer] . Location: [facility] . Date of last visit: 9/4/22 . Frequency: annual/as needed . Perceived helpfulness: n/a.  Psychiatrist: Yes. Name: SUKUMAR Arenas, CNP. Location: [facility], [place]. Date of last visit: two weeks ago . Frequency: n/a. Perceived helpfulness: helpful.  Therapist: Yes. Name: Navya . Location: [facility] . Date of last visit: last Tues . Frequency: 1x weekly . Perceived helpfulness: helpful .  : No     CTSS or ARMHS: No  ACT Team: No  Other: No      Diagnosis    300.02 (F41.1) Generalized Anxiety Disorder   Attention-Deficit/Hyperactivity Disorder  314.01 (F90.9) Unspecified Attention -Deficit / Hyperactivity Disorder - by history   296.30 (F33.9) Major Depressive Disorder, Recurrent Episode, Unspecified  _ and With anxious distress - by history     Clinical Summary and Substantiation of Recommendations    After therapeutic assessment, intervention and aftercare planning by ED care team and LM and in consultation with attending provider, the patient's circumstances and mental state were appropriate for outpatient management. It is the recommendation of this clinician that pt discharge with OP MH support while waiting for an opening for residential treatment at a crisis stabilization facility. A this time the pt is not presenting as an acute risk to self or others due to the following factors: pt has been experiencing SI without intent or plan and denies experiencing thoughts of harming others. Pt has multiple established outpatient support through her therapist whom she sees weekly in addition to her psychiatric nurse provider. [facility] team has been following pt closely to provide added support. Pt receives further support from her mother (whom she lives with) and grandmother who lives by. Pt's mother works from home and feels she can ensure of pt's safety until she is able to get into [facility], which she is currently accepted but on the waitlist for. Pt participated in safety contract planning and reported she could keep herself safe if discharged.   Spoke with Matt at [facility] and he reported of pt having the options to either remain on the waitlist for [facility] or they could facilitate transferring to [facility] as early as this evening due to their immediate openings. Pt and her mother both decided they wanted to continue to wait for [facility] opening. Hence, the recommendation is for pt to discharge home and follow up with her therapist (attend appt on Wed) in addition to scheduling an appt with her psychiatric nurse provider for medication adjustments until [facility] has an opening. Pt, pt's mother and ED physician are in agreement with this plan.      Placed call to the  Crisis Response Team ([facility]) at [phone]. Spoke with Lashaun provided clinical information. CRT will follow up upon pt discharging to the community by phone or in person, as appropriate. Faxed assessment to CRT at [phone].    Disposition    Recommended disposition: Individual Therapy, Medication Management and Residential Treatment: pt is accepted and on waitlist for [facility] will follow up with pt and facilitate       Reviewed case and recommendations with attending provider. Attending Name: Jaspreet Garcia MD        Attending concurs with disposition: Yes       Patient concurs with disposition: Yes       Guardian concurs with disposition: Yes      Final disposition: Residential treatment: pt is accepted and on waitlist for [facility] will follow up with pt and facilitate.       Outpatient Details (if applicable):   Aftercare plan and appointments placed in the AVS and provided to patient: Yes. Given to patient by ED staff    Was lethal means counseling provided as a part of aftercare planning? Yes - describe continue to lock away medication, remove access to any sharp objects       Assessment Details    Patient interview started at: 3:40 pm and completed at: 4:40 pm.     Total duration spent on the patient case in minutes: 2.50 hrs      CPT code(s) utilized: 59985 - Psychotherapy for Crisis - 60 (30-74*) min and 26966 - Psychotherapy for Crisis (Each additional 30 minutes) - 30 min        Anyi Briones MA, LMFT, LMHP  DEC - Triage & Transition Services  Callback: [phone]      Aftercare Plan  If I am feeling unsafe or I am in a crisis, I will:   Contact my established care providers   Call the National Suicide Prevention Lifeline: 988  Go to the nearest emergency room   Call 911     Warning signs that I or other people might notice when a crisis is developing for me:   -if I am experiencing increased suicidal thoughts that turn into having intent, plans or means   -if  "engaging in risky activities without thinking things through   -if experiencing increased depression, anxiety, and/or mood dysregulation/swings that impact ability to function   -if withdrawing from supportive family members     Things I am able to do on my own to cope or help me feel better:   -coloring   -listening to music  -visiting or talking to grandmother on phone   -using fidgets   -going on a walk    Things that I am able to do with others to cope or help me better:   -let supportive family members and professional provider team know if experiencing increased anxiety, depression, mood swings, and/or behavioral changes   -let others know how they can provide support, such as if to just be a \"listening ear\" or if seeking advice/resources, etc.      Things I can use or do for distraction:   -watch favorite television shows or movies of interest   -color   -listen to music   -talk to Grandmother   -pet dog, \"Bear\"   -use fidgets     Changes I can make to support my mental health and wellness:   -commit to 30 minutes of self care daily-take a shower, go for a walk, read, write, color, etc.   -use distraction skills (ideas listed above)   -repeat positive self affirmations and post in visible areas   -practice deep breathing,   -use [facility] resources through [facility], support groups, etc.   -maintain a daily schedule and routine when possible   -try to have consistent wake/sleep times   -download an jonatan that focuses on improving mental health such as Calm or Headspace (many others available)    People in my life that I can ask for help:   -mother and grandmother   -therapist  -medication management provider, Gayatri Newsome  -[facility]   -CRT ([facility] Crisis Team)     Your [facility] has a mental health crisis team you can call 24/7: [facility] Mobile Crisis Team (CRT) [phone]     Other things that are important when I'm in crisis: call or text a crisis line, call 911, notify mother, go to the " "nearest emergency room.     Additional resources and information:   Recommend to discharge to residential crisis treatment facility. You are accepted and on the waitlist at [facility] in [place] and there is immediate openings at [facility]. Contact [facility] at [phone] to update and to assist with facilitating.Recommend to follow up with therapist and attend appointment scheduled on Wednesday (if not yet in residential program) and to follow up with medication management provider, Gayatri Newsome through [facility] for soonest available appointment. Recommend to return to the emergency room if symptoms worsen.       Crisis Lines  Crisis Text Line  Text 815331  You will be connected with a trained live crisis counselor to provide support.    Por espanol, texto  MENDOZA a 932958 o texto a 442-AYUDAME en WhatsApp    [employer] (LGBTQ Youth Crisis Line)  4.633.985.0510  text START to 659-845      Pax8  Fast Tracker  Linking people to mental health and substance use disorder resources  Zuli.EcoFactor     [place] Mental Health Warm Line  Peer to peer support  Monday thru Saturday, 12 pm to 10 pm  945.490.5664 or 8.276.813.0088  Text \"Support\" to 87474    National [place] on Mental Illness (ESTUARDO)  836.619.0321 or 1.888.ESTUARDO.HELPS      Mental Health Apps  My3  https://[employer].org/    VirtualHopeBox  https://[employer].org/apps/virtual-hope-box/      Additional Information  Today you were seen by a licensed mental health professional through Triage and Transition services, [facility] ([facility])  for a crisis assessment in the Emergency Department at [facility].  It is recommended that you follow up with your established providers (psychiatrist, mental health therapist, and/or primary care doctor - as relevant) as soon as possible. Coordinators from [facility] will be calling you in the next 24-48 hours to ensure that you have the resources you need.  You " can also contact [facility] coordinators directly at [phone]. You may have been scheduled for or offered an appointment with a mental health provider. [facility] maintains an extensive network of licensed behavioral health providers to connect patients with the services they need.  We do not charge providers a fee to participate in our referral network.  We match patients with providers based on a patient's specific needs, insurance coverage, and location.  Our first effort will be to refer you to a provider within your care system, and will utilize providers outside your care system as needed.

## 2022-09-12 NOTE — ED PROVIDER NOTES
History     Chief Complaint   Patient presents with     Suicidal     Mental Health Problem     Here with Mom    The history is provided by the patient and a parent.     Verna Kauffman is a 22 year old female who has been having suicidal thoughts.  She tells me that she has been having anxiety and audible hallucinations and crying and then has had suicidal thoughts, no plans. She admits to texting men sexual thoughts. She is taking her Vraylar.     Her mom says that Verna has been struggling for a while. She was at [facility] in [place] for less than 24 hours on Thursday. She was up all night last night and then was trying to choke herself with the strings on her sweat pants. She was talking about hanging herself.  They spoke with CRT by phone and they are trying to arrange a stay at [facility].     She has ADHD, auditory hallucinations, major depression, AMANDA, intellectual disability.     Allergies:  Allergies   Allergen Reactions     Liraglutide Diarrhea     Clavulanic Acid      Depakote [Valproic Acid]      Makes her to sedate     Metronidazole Other (See Comments)     Psychosis     Trazodone Other (See Comments)     Induced noemy       Problem List:    Patient Active Problem List    Diagnosis Date Noted     Intellectual disability IQ 62 07/13/2019     Priority: Medium     Neuropsychological evaluation total scale IQ 62 from 2009.       Hallucinations 07/12/2019     Priority: Medium     Auditory hallucination 10/18/2018     Priority: Medium     Major depressive disorder, single episode, severe with mood-congruent psychotic features (H) 10/18/2018     Priority: Medium     Suicidal ideation 10/18/2018     Priority: Medium     Attention deficit hyperactivity disorder (ADHD), combined type 02/26/2018     Priority: Medium     Generalized anxiety disorder 01/28/2013     Priority: Medium     Learning disability 10/18/2012     Priority: Medium     Premature birth 10/18/2012     Priority: Medium     Overview:  "  Born at 23 6/7 weeks gestation, weight 1 pound 1 ounce       Separation anxiety disorder 11/14/2011     Priority: Medium     Chronic rhinitis 07/23/2008     Priority: Medium        Past Medical History:    Past Medical History:   Diagnosis Date     Anxiety        Past Surgical History:    Past Surgical History:   Procedure Laterality Date     CARDIAC SURGERY  2000     HC REMOVE TONSILS/ADENOIDS,<13 Y/O  12/26/11       Family History:    Family History   Problem Relation Age of Onset     Hypertension Paternal Grandfather      Neurologic Disorder Mother         ADHD     Depression Other         grandmother     Neurologic Disorder Father         learning problems       Social History:  Marital Status:  Single [1]  Social History     Tobacco Use     Smoking status: Current Every Day Smoker     Packs/day: 0.00     Types: Vaping Device     Smokeless tobacco: Never Used     Tobacco comment: vape   Vaping Use     Vaping Use: Every day     Substances: Nicotine     Devices: Disposable   Substance Use Topics     Alcohol use: Yes     Drug use: Not Currently     Types: Marijuana        Medications:    BYDUREON BCISE 2 MG/0.85ML auto-injector  FLUoxetine (PROZAC) 40 MG capsule  lamoTRIgine (LAMICTAL) 25 MG tablet  LORazepam (ATIVAN) 2 MG tablet  metFORMIN (GLUCOPHAGE) 500 MG tablet  UREA 20 INTENSIVE HYDRATING 20 % external cream  VRAYLAR 4.5 MG capsule          Review of Systems   Psychiatric/Behavioral: Positive for hallucinations and suicidal ideas. The patient is nervous/anxious.    All other systems reviewed and are negative.      Physical Exam   BP: (!) 158/102  Pulse: 120  Temp: 97.2  F (36.2  C)  Resp: 16  Height: 160 cm (5' 3\")  Weight: 103.4 kg (228 lb)  SpO2: 96 %      Physical Exam  Vitals and nursing note reviewed.   Constitutional:       General: She is not in acute distress.     Appearance: Normal appearance. She is normal weight. She is not ill-appearing, toxic-appearing or diaphoretic.   Cardiovascular:      " Rate and Rhythm: Normal rate and regular rhythm.      Pulses: Normal pulses.      Heart sounds: Normal heart sounds. No murmur heard.  Pulmonary:      Effort: Pulmonary effort is normal. No respiratory distress.      Breath sounds: Normal breath sounds.   Skin:     General: Skin is warm and dry.   Neurological:      General: No focal deficit present.      Mental Status: She is alert and oriented to person, place, and time.   Psychiatric:         Mood and Affect: Mood normal.         Behavior: Behavior normal.         ED Course     Mental Health Risk Assessment      PSS-3    Date and Time Over the past 2 weeks have you felt down, depressed, or hopeless? Over the past 2 weeks have you had thoughts of killing yourself? Have you ever attempted to kill yourself? When did this last happen? User   09/12/22 1309 yes yes yes within the last month (but not today) AB      C-SSRS ([place])    Date and Time Q1 Wished to be Dead (Past Month) Q2 Suicidal Thoughts (Past Month) Q3 Suicidal Thought Method Q4 Suicidal Intent without Specific Plan Q5 Suicide Intent with Specific Plan Q6 Suicide Behavior (Lifetime) Within the Past 3 Months? RETIRED: Level of Risk per Screen Screening Not Complete User   09/12/22 1408 yes yes yes yes no no -- -- -- KF   09/12/22 1309 yes yes yes no yes yes -- -- -- AB              Suicide assessment completed by mental health (D.E.C., LCSW, etc.)    Results for orders placed or performed during the hospital encounter of 09/12/22 (from the past 24 hour(s))   Extra Tube    Narrative    The following orders were created for panel order Extra Tube.  Procedure                               Abnormality         Status                     ---------                               -----------         ------                     Extra Serum Separator Tu...[[id number]]                      Final result                 Please view results for these tests on the individual orders.   Extra Serum Separator Tube (SST)    Result Value Ref Range    Hold Specimen     CBC with platelets differential    Narrative    The following orders were created for panel order CBC with platelets differential.  Procedure                               Abnormality         Status                     ---------                               -----------         ------                     CBC with platelets and d...[[id number]]  Abnormal            Final result                 Please view results for these tests on the individual orders.   Basic metabolic panel   Result Value Ref Range    Sodium 133 (L) 134 - 144 mmol/L    Potassium 3.7 3.5 - 5.1 mmol/L    Chloride 99 98 - 107 mmol/L    Carbon Dioxide (CO2) 25 21 - 31 mmol/L    Anion Gap 9 3 - 14 mmol/L    Urea Nitrogen 14 7 - 25 mg/dL    Creatinine 0.85 0.60 - 1.20 mg/dL    Calcium 10.1 8.6 - 10.3 mg/dL    Glucose 212 (H) 70 - 105 mg/dL    GFR Estimate >90 >60 mL/min/1.73m2   Ethanol GH   Result Value Ref Range    Alcohol ethyl <0.01 <=0.01 g/dL   Acetaminophen GH   Result Value Ref Range    Acetaminophen <2 (L) 10 - 30 mg/L   Salicylate level   Result Value Ref Range    Salicylate <0 (L) 15 - 30 mg/dL   CBC with platelets and differential   Result Value Ref Range    WBC Count 11.6 (H) 4.0 - 11.0 10e3/uL    RBC Count 5.04 3.80 - 5.20 10e6/uL    Hemoglobin 14.4 11.7 - 15.7 g/dL    Hematocrit 42.2 35.0 - 47.0 %    MCV 84 78 - 100 fL    MCH 28.6 26.5 - 33.0 pg    MCHC 34.1 31.5 - 36.5 g/dL    RDW 12.8 10.0 - 15.0 %    Platelet Count 255 150 - 450 10e3/uL    % Neutrophils 48 %    % Lymphocytes 38 %    % Monocytes 7 %    % Eosinophils 5 %    % Basophils 1 %    % Immature Granulocytes 1 %    NRBCs per 100 WBC 0 <1 /100    Absolute Neutrophils 5.5 1.6 - 8.3 10e3/uL    Absolute Lymphocytes 4.4 0.8 - 5.3 10e3/uL    Absolute Monocytes 0.8 0.0 - 1.3 10e3/uL    Absolute Eosinophils 0.6 0.0 - 0.7 10e3/uL    Absolute Basophils 0.1 0.0 - 0.2 10e3/uL    Absolute Immature Granulocytes 0.1 <=0.4 10e3/uL    Absolute NRBCs  0.0 10e3/uL   UA with Microscopic reflex to Culture    Specimen: Urine, Clean Catch   Result Value Ref Range    Color Urine Yellow Colorless, Straw, Light Yellow, Yellow    Appearance Urine Clear Clear    Glucose Urine 150  (A) Negative mg/dL    Bilirubin Urine Negative Negative    Ketones Urine Negative Negative mg/dL    Specific Gravity Urine 1.029 1.000 - 1.030    Blood Urine Negative Negative    pH Urine 6.5 5.0 - 9.0    Protein Albumin Urine 100  (A) Negative mg/dL    Urobilinogen Urine Normal Normal, 2.0 mg/dL    Nitrite Urine Negative Negative    Leukocyte Esterase Urine Negative Negative    Mucus Urine Present (A) None Seen /LPF    RBC Urine <1 <=2 /HPF    WBC Urine 2 <=5 /HPF    Narrative    Urine Culture not indicated   HCG qualitative urine (UPT)   Result Value Ref Range    hCG Urine Qualitative Negative Negative   Urine Drugs of Abuse Screen    Narrative    The following orders were created for panel order Urine Drugs of Abuse Screen.  Procedure                               Abnormality         Status                     ---------                               -----------         ------                     Urine Drugs of Abuse Scr...[[id number]]  Normal              Final result                 Please view results for these tests on the individual orders.   Urine Drugs of Abuse Screen Panel 13   Result Value Ref Range    Cannabinoids (44-mcl-4-carboxy-9-THC) Not Detected Not Detected    Phencyclidine Not Detected Not Detected    Cocaine (Benzoylecgonine) Not Detected Not Detected    Methamphetamine (d-Methamphetamine) Not Detected Not Detected    Opiates (Morphine) Not Detected Not Detected    Amphetamine (d-Amphetamine) Not Detected Not Detected    Benzodiazepines (Nordiazepam) Not Detected Not Detected    Tricyclic Antidepressants (Desipramine) Not Detected Not Detected    Methadone Not Detected Not Detected    Barbiturates (Butalbital) Not Detected Not Detected    Oxycodone Not Detected Not Detected     Propoxyphene (Norpropoxyphene) Not Detected Not Detected    Buprenorphine Not Detected Not Detected   Asymptomatic Influenza A/B & SARS-CoV2 (COVID-19) Virus PCR Multiplex Nose    Specimen: Nose; Swab   Result Value Ref Range    Influenza A PCR Negative Negative    Influenza B PCR Negative Negative    RSV PCR Negative Negative    SARS CoV2 PCR Negative Negative    Narrative    Testing was performed using the Xpert Xpress CoV2/Flu/RSV Assay on the [employer] GeneXpert Instrument. This test should be ordered for the detection of SARS-CoV-2 and influenza viruses in individuals who meet clinical and/or epidemiological criteria. Test performance is unknown in asymptomatic patients. This test is for in vitro diagnostic use under the FDA EUA for laboratories certified under CLIA to perform high or moderate complexity testing. This test has not been FDA cleared or approved. A negative result does not rule out the presence of PCR inhibitors in the specimen or target RNA in concentration below the limit of detection for the assay. If only one viral target is positive but coinfection with multiple targets is suspected, the sample should be re-tested with another FDA cleared, approved, or authorized test, if coinfection would change clinical management. This test was validated by the [facility]. These laboratories are certified under the Clinical  Laboratory Improvement Amendments of 1988 (CLIA-88) as qualified to perform high complexity laboratory testing.       Medications - No data to display    Assessments & Plan (with Medical Decision Making)  Verna Kauffman is a 22 year old female who has been having suicidal thoughts.  She tells me that she has been having anxiety and audible hallucinations and crying and then has had suicidal thoughts, no plans. She admits to texting men sexual thoughts. She is taking her Vraylar.  Her mom says that Verna has been struggling for a while. She was at [facility]  "in [place] for less than 24 hours on Thursday. She was up all night last night and then was trying to choke herself with the strings on her sweat pants. She was talking about hanging herself.  They spoke with CRT by phone and they are trying to arrange a stay at [facility].  She has ADHD, auditory hallucinations, major depression, AMANDA, intellectual disability.  VS in the ED BP (!) 158/102   Pulse 120   Temp 97.2  F (36.2  C)   Resp 16   Ht 1.6 m (5' 3\")   Wt 103.4 kg (228 lb)   LMP  (LMP Unknown)   SpO2 96%   BMI 40.39 kg/m    Exam is unremarkable.  Labs show CBC normal, BMP with Na 133, glucose 212, ethanol zero, Tylenol level zero, aspirin level zero, UA negative for UTI, UPT negative, UDS negative, 4 Plex negative. I spoke with DEC, who spoke with CRT: Verna is on the wait list for [facility], and [facility] has immediate openings. We could get her home tonight if they prefer to wait for [facility] or we could get her to [facility] this evening. They would prefer the home/ wait list for [facility] plan at this time.      I have reviewed the nursing notes.      Final diagnoses:   Auditory hallucination   Major depressive disorder, single episode, severe with mood-congruent psychotic features (H)       9/12/2022   [facility] AND [facility], Jaspreet Segundo MD  09/12/22 3430    "

## 2022-09-13 ENCOUNTER — DOCUMENTATION ONLY (OUTPATIENT)
Dept: OTHER | Facility: CLINIC | Age: 22
End: 2022-09-13

## 2023-01-06 ENCOUNTER — HOSPITAL ENCOUNTER (EMERGENCY)
Facility: OTHER | Age: 23
Discharge: HOME | End: 2023-01-06
Attending: PHYSICIAN ASSISTANT | Admitting: PHYSICIAN ASSISTANT
Payer: COMMERCIAL

## 2023-01-06 VITALS
HEIGHT: 62 IN | BODY MASS INDEX: 41.41 KG/M2 | HEART RATE: 110 BPM | SYSTOLIC BLOOD PRESSURE: 126 MMHG | WEIGHT: 225 LBS | TEMPERATURE: 99.6 F | RESPIRATION RATE: 22 BRPM | OXYGEN SATURATION: 95 % | DIASTOLIC BLOOD PRESSURE: 76 MMHG

## 2023-01-06 DIAGNOSIS — T50.901A OVERDOSE, ACCIDENTAL OR UNINTENTIONAL, INITIAL ENCOUNTER: ICD-10-CM

## 2023-01-06 LAB
ALBUMIN SERPL BCG-MCNC: 4.7 G/DL (ref 3.5–5.2)
ALP SERPL-CCNC: 54 U/L (ref 35–104)
ALT SERPL W P-5'-P-CCNC: 126 U/L (ref 10–35)
AMPHETAMINES UR QL SCN: ABNORMAL
ANION GAP SERPL CALCULATED.3IONS-SCNC: 14 MMOL/L (ref 7–15)
AST SERPL W P-5'-P-CCNC: 65 U/L (ref 10–35)
BARBITURATES UR QL SCN: ABNORMAL
BASOPHILS # BLD AUTO: 0.1 10E3/UL (ref 0–0.2)
BASOPHILS NFR BLD AUTO: 1 %
BENZODIAZ UR QL SCN: ABNORMAL
BILIRUB SERPL-MCNC: 0.4 MG/DL
BUN SERPL-MCNC: 8.9 MG/DL (ref 6–20)
BZE UR QL SCN: ABNORMAL
CALCIUM SERPL-MCNC: 9.6 MG/DL (ref 8.6–10)
CANNABINOIDS UR QL SCN: ABNORMAL
CHLORIDE SERPL-SCNC: 98 MMOL/L (ref 98–107)
CREAT SERPL-MCNC: 0.76 MG/DL (ref 0.51–0.95)
DEPRECATED HCO3 PLAS-SCNC: 25 MMOL/L (ref 22–29)
EOSINOPHIL # BLD AUTO: 0.3 10E3/UL (ref 0–0.7)
EOSINOPHIL NFR BLD AUTO: 3 %
ERYTHROCYTE [DISTWIDTH] IN BLOOD BY AUTOMATED COUNT: 13 % (ref 10–15)
GFR SERPL CREATININE-BSD FRML MDRD: >90 ML/MIN/1.73M2
GLUCOSE BLDC GLUCOMTR-MCNC: 152 MG/DL (ref 70–99)
GLUCOSE SERPL-MCNC: 136 MG/DL (ref 70–99)
HCT VFR BLD AUTO: 44.4 % (ref 35–47)
HGB BLD-MCNC: 14.7 G/DL (ref 11.7–15.7)
IMM GRANULOCYTES # BLD: 0.2 10E3/UL
IMM GRANULOCYTES NFR BLD: 2 %
LYMPHOCYTES # BLD AUTO: 4.3 10E3/UL (ref 0.8–5.3)
LYMPHOCYTES NFR BLD AUTO: 37 %
MCH RBC QN AUTO: 28.5 PG (ref 26.5–33)
MCHC RBC AUTO-ENTMCNC: 33.1 G/DL (ref 31.5–36.5)
MCV RBC AUTO: 86 FL (ref 78–100)
MONOCYTES # BLD AUTO: 1.1 10E3/UL (ref 0–1.3)
MONOCYTES NFR BLD AUTO: 10 %
NEUTROPHILS # BLD AUTO: 5.5 10E3/UL (ref 1.6–8.3)
NEUTROPHILS NFR BLD AUTO: 47 %
NRBC # BLD AUTO: 0 10E3/UL
NRBC BLD AUTO-RTO: 0 /100
OPIATES UR QL SCN: ABNORMAL
PCP QUAL URINE (ROCHE): ABNORMAL
PLATELET # BLD AUTO: 261 10E3/UL (ref 150–450)
POTASSIUM SERPL-SCNC: 3.6 MMOL/L (ref 3.4–5.3)
PROT SERPL-MCNC: 7.3 G/DL (ref 6.4–8.3)
RBC # BLD AUTO: 5.16 10E6/UL (ref 3.8–5.2)
SODIUM SERPL-SCNC: 137 MMOL/L (ref 136–145)
WBC # BLD AUTO: 11.5 10E3/UL (ref 4–11)

## 2023-01-06 PROCEDURE — 99284 EMERGENCY DEPT VISIT MOD MDM: CPT | Mod: 25 | Performed by: PHYSICIAN ASSISTANT

## 2023-01-06 PROCEDURE — 85004 AUTOMATED DIFF WBC COUNT: CPT | Performed by: PHYSICIAN ASSISTANT

## 2023-01-06 PROCEDURE — 93010 ELECTROCARDIOGRAM REPORT: CPT | Performed by: STUDENT IN AN ORGANIZED HEALTH CARE EDUCATION/TRAINING PROGRAM

## 2023-01-06 PROCEDURE — 96374 THER/PROPH/DIAG INJ IV PUSH: CPT | Performed by: PHYSICIAN ASSISTANT

## 2023-01-06 PROCEDURE — 80307 DRUG TEST PRSMV CHEM ANLYZR: CPT | Performed by: PHYSICIAN ASSISTANT

## 2023-01-06 PROCEDURE — 93005 ELECTROCARDIOGRAM TRACING: CPT | Performed by: PHYSICIAN ASSISTANT

## 2023-01-06 PROCEDURE — 80053 COMPREHEN METABOLIC PANEL: CPT | Performed by: PHYSICIAN ASSISTANT

## 2023-01-06 PROCEDURE — 36415 COLL VENOUS BLD VENIPUNCTURE: CPT | Performed by: PHYSICIAN ASSISTANT

## 2023-01-06 PROCEDURE — 250N000011 HC RX IP 250 OP 636: Performed by: PHYSICIAN ASSISTANT

## 2023-01-06 PROCEDURE — 96361 HYDRATE IV INFUSION ADD-ON: CPT | Performed by: PHYSICIAN ASSISTANT

## 2023-01-06 PROCEDURE — 99283 EMERGENCY DEPT VISIT LOW MDM: CPT | Performed by: PHYSICIAN ASSISTANT

## 2023-01-06 PROCEDURE — 258N000003 HC RX IP 258 OP 636: Performed by: PHYSICIAN ASSISTANT

## 2023-01-06 RX ORDER — ONDANSETRON 2 MG/ML
4 INJECTION INTRAMUSCULAR; INTRAVENOUS ONCE
Status: COMPLETED | OUTPATIENT
Start: 2023-01-06 | End: 2023-01-06

## 2023-01-06 RX ADMIN — SODIUM CHLORIDE 1000 ML: 9 INJECTION, SOLUTION INTRAVENOUS at 21:28

## 2023-01-06 RX ADMIN — ONDANSETRON 4 MG: 2 INJECTION INTRAMUSCULAR; INTRAVENOUS at 21:44

## 2023-01-06 ASSESSMENT — ACTIVITIES OF DAILY LIVING (ADL)
ADLS_ACUITY_SCORE: 35
ADLS_ACUITY_SCORE: 35

## 2023-01-07 NOTE — ED NOTES
poison control called for advice regarding medications ingested    Routine  care  Anticipatory guidance  Encourage BF  Tc bili at 36 hrs  [facility], [facility], [place] screen PTD

## 2023-01-07 NOTE — ED NOTES
Poison control called and are signing off chart and stated we can discharge patient at any time PA aware

## 2023-01-07 NOTE — DISCHARGE INSTRUCTIONS
Thank you for giving us the opportunity to see you.     If you had lab work, cultures or imaging studies done during your stay, the final results may still be pending. We will call you if your plan of care needs to change.     After discharge, please closely monitor for any new or worsening symptoms. You should return to the Emergency Department if you experience new or worsening symptoms, especially chest pain, shortness of breath, fevers/chills, nausea or vomiting, you faint, or any other concerning findings.     Please follow up with your psychiatrist next week along with your primary care provider.

## 2023-01-07 NOTE — ED TRIAGE NOTES
Patient arrived via ems from home after mom called ambulance d/t acting funny after taking medications. Pt states she vaped a delta pen and had 3 puffs, took 2 sleep walker pills that she bought at [facility] and 3 2mg ativan pills at approx 5737-1222 today. Pt states she did not take pills to kill herself, states she has bipolar issues and just wanted her voices to stop and calm down. Denies si=uicidal thoughts or ideation. Pt is alert and orientated on arrival. Pupils reactive. Pt tachy but denies pain. MD at bedside      Triage Assessment     Row Name 01/06/23 1931       Triage Assessment (Adult)    Airway WDL WDL       Respiratory WDL    Respiratory WDL WDL       Skin Circulation/Temperature WDL    Skin Circulation/Temperature WDL WDL       Cardiac WDL    Cardiac WDL X;rhythm    Pulse Rate & Regularity tachycardic       Peripheral/Neurovascular WDL    Peripheral Neurovascular WDL WDL       Cognitive/Neuro/Behavioral WDL    Cognitive/Neuro/Behavioral WDL WDL

## 2023-01-07 NOTE — ED PROVIDER NOTES
[facility] ED Provider Note   CC:     Chief Complaint   Patient presents with     Drug Overdose       ED Nurse's notes:  Patient arrived via ems from home after mom called ambulance d/t acting funny after taking medications. Pt states she vaped a delta pen and had 3 puffs, took 2 sleep walker pills that she bought at [employer] and 3 2mg ativan pills at approx 4522-9505 today. Pt states she did not take pills to kill herself, states she has bipolar issues and just wanted her voices to stop and calm down. Denies suicidal thoughts or ideation. Pt is alert and orientated on arrival. Pupils reactive. Pt tachy but denies pain. MD at bedside     HPI:    Verna Kauffman is a 22 year old female who presented to the emergency department for possible overdose.  She states she has been feeling very anxious lately and has been hearing voices.  She recently saw her psychiatrist and they had increased her Vraylar medication.  She denies suicidal or homicidal thoughts.  She admits to vaping a delta 10 pen, took 2 sleepwalker pills, and a total of 6 mg of Ativan around 1230-1:00 today.  She states she was acting out of sorts which prompted her mom to call the ambulance.  Currently she feels fine.  Denies chest pain, shortness of breath, abdominal pain, nausea, vomiting.    Past Medical History:   Past Medical History:   Diagnosis Date     Anxiety         Past Surgical History:   Past Surgical History:   Procedure Laterality Date     CARDIAC SURGERY  2000     HC REMOVE TONSILS/ADENOIDS,<13 Y/O  12/26/11        Social History:   Social History     Tobacco Use     Smoking status: Every Day     Packs/day: 0.00     Types: Vaping Device, Cigarettes     Smokeless tobacco: Never     Tobacco comments:     vape   Vaping Use     Vaping Use: Every day     Substances: Nicotine     Devices: Disposable   Substance Use Topics     Alcohol use: Yes     Drug use: Not Currently      Types: Marijuana       Medications:    Current Outpatient Rx   Medication Sig Dispense Refill     BYDUREON BCISE 2 MG/0.85ML auto-injector        FLUoxetine (PROZAC) 40 MG capsule Take 40 mg by mouth daily       lamoTRIgine (LAMICTAL) 25 MG tablet Take 25 mg by mouth daily       LORazepam (ATIVAN) 2 MG tablet Take 1 tablet (2 mg) by mouth every 6 hours as needed for anxiety       metFORMIN (GLUCOPHAGE) 500 MG tablet Take 1,000 mg by mouth 2 times daily (with meals)       UREA 20 INTENSIVE HYDRATING 20 % external cream        VRAYLAR 4.5 MG capsule          Allergies:  Liraglutide, Clavulanic acid, Depakote [valproic acid], Metronidazole, and Trazodone      Medical records were reviewed and are summarized above.      Review of Systems     Complete review of systems negative except as noted in HPI    Immunization status was reviewed   Immunization History   Administered Date(s) Administered     Comvax (HIB/HepB) 2000, 2000, 05/24/2001     DTAP (<7y) 2000, 2000, 2000, 02/04/2002, 06/30/2006     Flu, Unspecified 2000     HepB 2000, 2000, 05/24/2001     HepB, Unspecified 2000, 2000, 05/24/2001     MMR 09/27/2001, 06/30/2006     Pneumococcal (PCV 7) 2000, 2000, 05/24/2001, 02/04/2002     Poliovirus, inactivated (IPV) 2000, 2000, 05/24/2001, 06/30/2006     Synagis 2000     Tdap (Adacel,Boostrix) 04/15/2021     Varicella 05/24/2001       Physical Exam     Vitals:    01/06/23 2215 01/06/23 2230 01/06/23 2245 01/06/23 2330   BP: (!) 159/88 120/66 (!) 157/103 126/76   Pulse: (!) 121 (!) 123 114 110   Resp: 23 17 22 22   Temp:       TempSrc:       SpO2:    95%   Weight:       Height:             GENERAL APPEARANCE: 22 year old female that is alert, pleasant, NAD  FACE: normal facies  EYES: Pupils are equal and reactive  NECK: No palpable lymphadenopathy, and there is no apparent tenderness. No asymmetry noted  RESP: normal respiratory  effort. Clear breath sounds bilaterally, with good air movement throughout.  CV: tachycardic, rate 120s. No significant murmurs, gallops or rubs  ABD: soft, with no tenderness.  No rebound or guarding.  Bowel sounds are normal  MS: no gross deformities noted; normal muscle tone.  SKIN: no worrisome rash  NEURO: no facial droop; no focal deficits, speech is normal  PSYCH: Normal mood and affect is congruent.  Behavior is normal. Thought content normal. Speech is not slurred. Thought content is not paranoid and not delusional. The patient expresses no homicidal and no suicidal ideation. They express no suicidal plans and no homicidal plans.       Available Lab/Imaging Results from the past 48 hours     Results for orders placed or performed during the hospital encounter of 01/06/23 (from the past 24 hour(s))   CBC with platelets differential    Narrative    The following orders were created for panel order CBC with platelets differential.  Procedure                               Abnormality         Status                     ---------                               -----------         ------                     CBC with platelets and d...[[id number]]  Abnormal            Final result                 Please view results for these tests on the individual orders.   Comprehensive metabolic panel   Result Value Ref Range    Sodium 137 136 - 145 mmol/L    Potassium 3.6 3.4 - 5.3 mmol/L    Chloride 98 98 - 107 mmol/L    Carbon Dioxide (CO2) 25 22 - 29 mmol/L    Anion Gap 14 7 - 15 mmol/L    Urea Nitrogen 8.9 6.0 - 20.0 mg/dL    Creatinine 0.76 0.51 - 0.95 mg/dL    Calcium 9.6 8.6 - 10.0 mg/dL    Glucose 136 (H) 70 - 99 mg/dL    Alkaline Phosphatase 54 35 - 104 U/L    AST 65 (H) 10 - 35 U/L     (H) 10 - 35 U/L    Protein Total 7.3 6.4 - 8.3 g/dL    Albumin 4.7 3.5 - 5.2 g/dL    Bilirubin Total 0.4 <=1.2 mg/dL    GFR Estimate >90 >60 mL/min/1.73m2   CBC with platelets and differential   Result Value Ref Range    WBC Count  11.5 (H) 4.0 - 11.0 10e3/uL    RBC Count 5.16 3.80 - 5.20 10e6/uL    Hemoglobin 14.7 11.7 - 15.7 g/dL    Hematocrit 44.4 35.0 - 47.0 %    MCV 86 78 - 100 fL    MCH 28.5 26.5 - 33.0 pg    MCHC 33.1 31.5 - 36.5 g/dL    RDW 13.0 10.0 - 15.0 %    Platelet Count 261 150 - 450 10e3/uL    % Neutrophils 47 %    % Lymphocytes 37 %    % Monocytes 10 %    % Eosinophils 3 %    % Basophils 1 %    % Immature Granulocytes 2 %    NRBCs per 100 WBC 0 <1 /100    Absolute Neutrophils 5.5 1.6 - 8.3 10e3/uL    Absolute Lymphocytes 4.3 0.8 - 5.3 10e3/uL    Absolute Monocytes 1.1 0.0 - 1.3 10e3/uL    Absolute Eosinophils 0.3 0.0 - 0.7 10e3/uL    Absolute Basophils 0.1 0.0 - 0.2 10e3/uL    Absolute Immature Granulocytes 0.2 <=0.4 10e3/uL    Absolute NRBCs 0.0 10e3/uL   Glucose by meter   Result Value Ref Range    GLUCOSE BY METER POCT 152 (H) 70 - 99 mg/dL   Urine Drugs of Abuse Screen    Narrative    The following orders were created for panel order Urine Drugs of Abuse Screen.  Procedure                               Abnormality         Status                     ---------                               -----------         ------                     Drug abuse screen 77 uri...[[id number]]  Abnormal            Final result                 Please view results for these tests on the individual orders.   Drug abuse screen 77 urine (FL, RH, SH)   Result Value Ref Range    Amphetamines Urine Screen Negative Screen Negative    Barbituates Urine Screen Negative Screen Negative    Benzodiazepine Urine Screen Negative Screen Negative    Cannabinoids Urine Screen Positive (A) Screen Negative    Opiates Urine Screen Negative Screen Negative    PCP Urine Screen Negative Screen Negative    Cocaine Urine Screen Negative Screen Negative         Medicine used during this ED stay:     Medications   0.9% sodium chloride BOLUS (0 mLs Intravenous Stopped 1/6/23 7820)   ondansetron (ZOFRAN) injection 4 mg (4 mg Intravenous Given 1/6/23 3862)        Procedures:  Procedures     Impression     Final diagnoses:   Overdose, accidental or unintentional, initial encounter           ED Course     Patient first seen at 8:37 PM    22 year old female with history of bipolar, auditory hallucinations, and anxiety who was brought to the ED today for accidental overdose.  Patient had some tachycardia, otherwise remained hemodynamically stable.  She was able to discharge home with her grandfather in stable condition.  Recommended she follow up with her primary care provider and therapist regarding her anxiety and medication adjustments.      ED Course as of 01/06/23 2352   Fri Jan 06, 2023 1958 Patient seen on arrival.  ABCs intact.  She is able to tell us what she took and when.  She vaped a Delta 10 pen, took 2 sleep walker pills, and took 6 mg of Ativan at once.  She did this around 1230 this afternoon.  She was acting weird, so her mom called 911. She states she was trying to relax as she was hearing voices.  She has anxiety along with bipolar disorder.  She recently saw her therapist and her Vraylar was recently increased.     2002 EKG shows sinus tachycardia, rate 123. No QTc prolongation.  Normal QRS.     2036 Called and spoke with poison control.  Patient remains tachycardic.  Recommend continuing to monitor her.  Ordered 1 liter fluid bolus and continue monitoring.     2347 Heart rate continues to improve.  Poison control called back and have cleared her for discharge.             Discharge Medication List as of 1/6/2023 11:43 PM          Discussed findings including differential diagnosis, potential for worsening or changing/evolving symptoms that could necessitate re-evaluation in the ER, and inherent uncertainty with any medical tests/diagnoses.Explained we have attempted to rule out life threatening causes for current symptomatology and made recommendations for outpatient followup with primary physician for further assessment and management of symptoms.      Jacy Zhu PA-C

## 2023-01-09 LAB
ATRIAL RATE - MUSE: 123 BPM
DIASTOLIC BLOOD PRESSURE - MUSE: NORMAL MMHG
INTERPRETATION ECG - MUSE: NORMAL
P AXIS - MUSE: 47 DEGREES
PR INTERVAL - MUSE: 158 MS
QRS DURATION - MUSE: 82 MS
QT - MUSE: 304 MS
QTC - MUSE: 435 MS
R AXIS - MUSE: -11 DEGREES
SYSTOLIC BLOOD PRESSURE - MUSE: NORMAL MMHG
T AXIS - MUSE: 4 DEGREES
VENTRICULAR RATE- MUSE: 123 BPM

## 2023-01-11 LAB
C TRACH DNA SPEC QL PROBE+SIG AMP: NOT DETECTED
N GONORRHOEA DNA SPEC QL PROBE+SIG AMP: NOT DETECTED
SPECIMEN DESCRIP: NORMAL
SPECIMEN DESCRIPTION: NORMAL

## 2023-04-18 LAB — HBA1C MFR BLD: 6.8 % (ref 4–5.6)

## 2023-08-02 ENCOUNTER — TRANSFERRED RECORDS (OUTPATIENT)
Dept: MULTI SPECIALTY CLINIC | Facility: CLINIC | Age: 23
End: 2023-08-02

## 2023-08-02 LAB
ALT SERPL-CCNC: 64 IU/L (ref 6–31)
AST SERPL-CCNC: 64 IU/L (ref 6–31)
CREATININE (EXTERNAL): 0.75 MG/DL (ref 0.4–1)
GFR ESTIMATED (EXTERNAL): 115 ML/MIN/1.73M2
GLUCOSE (EXTERNAL): 121 MG/DL (ref 70–99)
POTASSIUM (EXTERNAL): 4.3 MEQ/L (ref 3.4–5.1)
TSH SERPL-ACNC: 0.98 UIU/ML (ref 0.4–3.99)

## 2023-10-04 ENCOUNTER — HOSPITAL ENCOUNTER (EMERGENCY)
Facility: OTHER | Age: 23
Discharge: HOME | End: 2023-10-04
Attending: FAMILY MEDICINE | Admitting: FAMILY MEDICINE
Payer: COMMERCIAL

## 2023-10-04 VITALS
BODY MASS INDEX: 40.75 KG/M2 | SYSTOLIC BLOOD PRESSURE: 157 MMHG | HEIGHT: 63 IN | HEART RATE: 117 BPM | DIASTOLIC BLOOD PRESSURE: 99 MMHG | OXYGEN SATURATION: 95 % | RESPIRATION RATE: 20 BRPM | TEMPERATURE: 97.7 F | WEIGHT: 230 LBS

## 2023-10-04 DIAGNOSIS — Z53.21 PATIENT LEFT WITHOUT BEING SEEN: ICD-10-CM

## 2023-10-04 LAB — GLUCOSE BLDC GLUCOMTR-MCNC: 137 MG/DL (ref 70–99)

## 2023-10-04 PROCEDURE — 82962 GLUCOSE BLOOD TEST: CPT

## 2023-10-04 PROCEDURE — 99283 EMERGENCY DEPT VISIT LOW MDM: CPT | Performed by: FAMILY MEDICINE

## 2023-10-04 NOTE — ED TRIAGE NOTES
"Pt arrived via private vehicle with mom. Pt states she hasn't been feeling right for a few days, mom has noticed she has seemed off and cool too touch. Pt states she has increased fatigue, low appetite, afebrile, shaking and somewhat pale. Pt is DM2 but doesn't like to wear her dexacom and has not been able to check her BGs at home.    in triage but pt had a couple bites of ice cream before arriving eyes were fluttering.   Mom has also stated that she has a suspected seizure issue but has never had a full diagnosis of seizures but today her behavior is also similar to those instances.     BP (!) 157/99   Pulse 117   Temp 97.7  F (36.5  C) (Temporal)   Resp 20   Ht 1.6 m (5' 3\")   Wt 104.3 kg (230 lb)   SpO2 95%   BMI 40.74 kg/m         Triage Assessment       Row Name 10/04/23 1830       Triage Assessment (Adult)    Airway WDL WDL       Respiratory WDL    Respiratory WDL WDL       Skin Circulation/Temperature WDL    Skin Circulation/Temperature WDL WDL       Cardiac WDL    Cardiac WDL X;rhythm    Cardiac Rhythm ST       Peripheral/Neurovascular WDL    Peripheral Neurovascular WDL WDL       Cognitive/Neuro/Behavioral WDL    Cognitive/Neuro/Behavioral WDL WDL                    "

## 2023-10-04 NOTE — ED PROVIDER NOTES
"10/04/23  6:50 PM     Verna is a 22 yo female that was feeling like she had a change in vision at home.  She told her mom that she \"could only look upwards\" and could not look down.  Verna has not been checking her blood sugar lately and her mom felt certain that her blood sugar was elevated.  Once they were here in the ED she noticed that the vision concern was improved. Her FSBS here was 137 in triage.  She and her mom do not want a work up and would like to go.     Diagnosis:  (Z53.21) Patient left without being seen        Plan: discharge          Jaspreet Garcia MD  10/04/23 8865    "

## 2024-01-01 NOTE — DISCHARGE INSTRUCTIONS
Behavioral Discharge Planning and Instructions    Summary: Pt was admitted due to hallucinations with suicidal ideation.     Main Diagnosis: Major Depressive Disorder, single, severe with psychotic features.    Major Treatments, Procedures and Findings: Stabilize with medications, connect with community programs.    Symptoms to Report: feeling more aggressive, increased confusion, losing more sleep, mood getting worse or thoughts of suicide    Lifestyle Adjustment: Take all medications as prescribed, meet with doctor/ medication provider and outpatient provider as scheduled. Abstain from alcohol or any unprescribed drugs.    Psychiatry Follow-up:   As previously scheduled    Resources:   Crisis Intervention: 682.578.7877 or 370-595-0601 (TTY: 981.422.8985).  Call anytime for help.  National [place] on Mental Illness (www.[place].peter.org): 585.202.1566 or 242-531-7974.  Alcoholics Anonymous (www.alcoholics-anonymous.org): Check your phone book for your local chapter.  Suicide Awareness Voices of Education (SAVE) (www.save.org): 307-514-HLXT (3661)  National Suicide Prevention Line (www.mentalhealthmn.org): 122-149-HSML (0152)  Mental Health Consumer/Survivor Network of [place] (www.mhcsn.net): 738.540.2674 or 415-064-1806  Mental Health Association of [place] (www.mentalhealth.org): 469.663.8098 or 649-618-5868    [place] Area:  [facility] 266.740.3627  [facility] Crisis Line: 1-210.336.1741  Advocates For Family Peace: 804-5771  Sexual Assault Program [facility]: 829.676.3226 or 1-999.258.5398  [facility] Battered Women's Program: 1-222.901.1169 Ext: 279       Calls answered Mon-Fri-8:00 am--4:30 pm    [place]:  [facility] for Family Peace: 1-710.851.8256  [facility] first call for help: 6-320-434-6684  [facility]:  (709) 688-2723      [place] Area:  Warm Line: 1-869.446.2677       Calls answered Tuesday--Saturday 4:00 pm--10:00 pm  [facility] Crisis Line -  done "688-994-6880  [place] [facility] 273-400-6134    [place] Statewide:  [place] Crisis and Referral Services: 1-178.878.4405  National Suicide Prevention Lifeline: 6-016-536-TALK (9015)   - pwp7jghe- Text \"Life\" to 33791  First Call for Help: 2-1-1  [facility] Helpline- 1-270-WFXF-HELP    General Medication Instructions:   See your medication sheet(s) for instructions.   Take all medicines as directed.  Make no changes unless your doctor suggests them.   Go to all your doctor visits.  Be sure to have all your required lab tests. This way, your medicines can be refilled on time.  Do not use any drugs not prescribed by your doctor.  Avoid alcohol.      "

## 2024-04-22 ENCOUNTER — TRANSFERRED RECORDS (OUTPATIENT)
Dept: HEALTH INFORMATION MANAGEMENT | Facility: CLINIC | Age: 24
End: 2024-04-22
Payer: COMMERCIAL

## 2024-04-29 DIAGNOSIS — G47.30 SLEEP APNEA: Primary | ICD-10-CM

## 2024-04-29 DIAGNOSIS — J96.01 ACUTE RESPIRATORY FAILURE WITH HYPOXIA (H): ICD-10-CM

## 2024-05-21 ENCOUNTER — DOCUMENTATION ONLY (OUTPATIENT)
Dept: SLEEP MEDICINE | Facility: HOSPITAL | Age: 24
End: 2024-05-21

## 2024-05-22 NOTE — PROGRESS NOTES
SLEEP HISTORY QUESTIONNAIRE    Please describe the main reason for your sleep appointment? Quit breathing when sleeping, oxygen goes down when sleeping.    How long has this been a problem? Just found out    Have you been diagnosed with a sleep problem in the past? NO    If so, what?     What treatment was recommended?     Have you had a sleep study in the past? NO    If yes, where and when?     Sleep Habits:   Do you read in bed? No  Do you eat in bed? No  Do you watch TV in bed? No  Do you work in bed? No  Do you use a phone or computer in bed? Yes    Is you sleep disturbed by:   Bed partner: No  Children: No  Noise: Yes   Pets: Yes  Other:       On two or more nights per week, do you drink alcohol to help you fall asleep?NO    On two or more nights per week, do you take melatonin to help you fall asleep? YES    On two or more nights per week, do you take over the counter medicine to fall asleep?  YES    Do you take drinks with caffeine (coffee, tea, soda, energy drinks)? YES    Do you have 3 or more caffeine drinks in a day? YES    Do you have caffeine drinks within 6 hours of bedtime? YES    Do you smoke or use tobacco? YES    Do you exercise? NO    Sleep Routine:   Using a 24 Hour Clock    What time do you usually get into bed on workdays? 12am    Weekend/non work days?     What time do you get out of bed on workdays? 10am      Weekend/non work days?    Do you work the evening or night shift or do your shifts rotate? DOES NOT APPLY    How long does it usually take to fall to sleep? 15 min    How many times do you wake during the night? 10    How much time do you feel that you are awake during the entire night? 1 hour    How long does it take for you to fall back to sleep after you wake up? 10 min    Why do you think you wake up? Bad dream    What do you do when you wake up? Go to mom's room    How much sleep do you think you get on work nights?     How much sleep do you think you get on weekends/non work days?      How much sleep do you think you need to feel your best? 8    How many days during a week do you take a nap on average? everyday    What is the average length of your naps? 30 min    Do you feel better after taking a nap? YES    If you could chose the best sleep schedule for you, what time would you go to bed? 9pm  What time would you get up? 11am    Do you read in bed? NO    Do you eat in bed? NO    Do you watch TV in bed? NO    Do you do work in bed? NO    Do you use a computer or phone in bed? YES    Sleep Disruptions?   Leg movements:  Do you ever have restless, crawling, aching or other unusual feelings in your legs? NO    Do you ever wake yourself by kicking your legs during the night? NO    Are the sheets and blankets messed up or tossed about when you get up? YES    Night-time behaviors:   Do you have nightmares or night terrors? YES   How often? 3    Have you had times when you were sleep walking? NO    Have you been seen doing anything unusual while you sleep at nights? NO  What?   How often?     Have you ever hurt yourself or someone else while you were sleeping? NO  Please describe:     Do you clench or grind your teeth during the night? no    Sleep Apnea (pauses in breathing during sleep):  Do you wake with a headache in the morning? YES  How often? 2    Does your bed partner, family or friends ever say that you snore? YES  How many nights per week do you snore?   Can snoring be heard outside the bedroom? no    Do you ever wake yourself up from snoring, gasping or choking? YES    Have you ever been told that you stop breathing or have pauses in your breathing? YES    Do you wake in the morning with a dry throat or mouth? YES    Do you have trouble breathing through your nose? YES    Do you have problems with heartburn, reflux or a hiatal hernia? NO    Which positions do you usually sleep in? (stomach, back, sides, all) sides    Do you use oxygen or any other medical equipment when you sleep? NO    Do  members of your family (related by blood) snore? YES    Have any members of your family been diagnosed with with sleep apnea? NO    Do other members of your family have restless leg? NO    Do other members of your family have sleep walking? NO    Have you ever had an accident, or near accident due to sleepiness while driving? NO    Does your sleepiness affect your work on the job or at school? YES    Do you ever fall asleep by accident while doing a task? NO    Have you had sudden muscle weakness when laughing, angry or surprised? NO    Have you ever been unable to move your body when falling asleep or waking up? NO    Do you ever have trouble  your dreams from real life events? NO  Please describe:     Physical Health: (including illness and injury): During the past 30 days, on how many days was your physical health not good? /30 days     Mental Health: (including stress, depression, and problems with emotions): During the last 30 days, how may days was your mental health not good? 30 days.     During the past 30 days, on how many days did poor physical or mental health keep you from doing your usual activities? This might be self-care, work, or play? 30 days.     Social History:   Marital status: single    Who lives in your home with you? mother    Mother (alive or dead)? alive If has , from what?   Father (alive or dead)?  If has , from what?     Siblings:   Have any ?   If so, from what?     Currently working? NO  If yes, work:   Former jobs:      Sleepiness Scale:   Sitting and reading 3   Watching TV 2   Sitting in a public place 0   Riding in a car 3   Lying down to rest in the afternoon 3   Sitting and talking to someone 0   Sitting quietly after a lunch without alcohol 0   In a car, stopping for a few minutes in traffic 0       Surgical History:   Past Surgical History:   Procedure Laterality Date    CARDIAC SURGERY      HC REMOVE TONSILS/ADENOIDS,<13 Y/O  11        Medical Conditions:   Past Medical History:   Diagnosis Date    Anxiety        Medications:   Current Outpatient Medications   Medication Sig Dispense Refill    ALPRAZolam (XANAX) 1 MG tablet Take 1 tablet by mouth 2 times daily      BYDUREON BCISE 2 MG/0.85ML auto-injector  (Patient not taking: Reported on 8/2/2023)      empagliflozin (JARDIANCE) 25 MG TABS tablet Take 25 mg by mouth      FLUoxetine (PROZAC) 40 MG capsule Take 60 mg by mouth daily      lamoTRIgine (LAMICTAL) 25 MG tablet Take 25 mg by mouth daily (Patient not taking: Reported on 8/2/2023)      lisinopril (ZESTRIL) 10 MG tablet Take 1 tablet by mouth daily      LORazepam (ATIVAN) 2 MG tablet Take 1 tablet (2 mg) by mouth every 6 hours as needed for anxiety      metFORMIN (GLUCOPHAGE) 500 MG tablet Take 1,000 mg by mouth 2 times daily (with meals)      OLANZapine zydis (ZYPREXA) 5 MG ODT Take 5 mg by mouth 2 times daily as needed      TRULICITY 1.5 MG/0.5ML pen Inject 1.5 mg Subcutaneous      UREA 20 INTENSIVE HYDRATING 20 % external cream  (Patient not taking: Reported on 8/2/2023)      VRAYLAR 4.5 MG capsule        No current facility-administered medications for this visit.       Are you currently having any of the following symptoms?   General:   Obvious weight gain or loss YES  Fever, chills or sweats YES  Drug allergies:     Eyes:   Changes in vision YES  Blind spots YES  Double vision YES  Other looking lp    Ear, Nose and Throat:   Ear pain NO  Sore throat YES  Sinus pain NO  Post-nasal drip NO  Runny nose YES  Bloody nose NO    Heart:   Rapid or irregular heart beat YES  Chest pain or pressure YES  Out of breath when lying down YES  Swelling in feet or legs NO  High blood pressure YES  Heart disease NO    Nervous system   Headaches YES  Weakness in arms or legs YES  Numbness in arms of legs YES  Other:     Skin  Rashes NO  New moles or skin changes NO  Other     Lungs  Shortness of breath at rest NO  Shortness of breath with activity  YES  Dry cough NO  Coughing up mucous or phlegm NO  Coughing up blood NO  Wheezing when breathing YES    Lymph System  Swollen lymph nodes NO  New lumps or bumps NO  Changes in breasts or discharge NO    Digestive System   Nausea or vomiting YES  Loose or watery stools YES  Hard, dry stools (constipation) NO  Fat or grease in stools NO  Blood in stools NO  Stools are black or bloody YES  Abdominal (belly) pain YES    Urinary Tract   Pain when you urinate (pee) NO  Blood in your urine NO  Urinate (pee) more than normal NO  Irregular periods YES    Muscles and bones   Muscle pain NO  Joint or bone pain NO  Swollen joints NO  Other     Glands  Increased thirst or urination YES  Diabetes YES  Morning glucose:   Afternoon glucose:     Mental Health  Depression YES  Anxiety YES  Other mental health issues: hearing voices

## 2024-05-22 NOTE — PROGRESS NOTES
STOP BANG       Name: Verna Kauffman MRN# [id number]   Age: 24 year old YOB: 2000     Stop Bang questionnaire completed with a score of >3 to allow for HST     Have you been told you snore loudly (louder than talking or loud enough to be heard through doors)? YES    Do you often feel tired, fatigued, or sleepy during the daytime? YES    Has anyone observed you stop breathing during your sleep? YES    Do you have or are you being treated for high blood pressure? YES    Is your BMI greater than 35? YES    Is your neck size circumference 16 inches or greater? YES    Are you over 50 years old? NO    Stop Bang Score (# of yes): 6

## 2024-05-23 NOTE — PROGRESS NOTES
Chart review prior to sleep testing.    Patient Summary:  24 year old female who is referred for sleep-disordered breawthing.    Patient Active Problem List    Diagnosis Date Noted    Intellectual disability IQ 62 07/13/2019     Priority: Medium     Neuropsychological evaluation total scale IQ 62 from 2009.      Hallucinations 07/12/2019     Priority: Medium    Auditory hallucination 10/18/2018     Priority: Medium    Major depressive disorder, single episode, severe with mood-congruent psychotic features (H) 10/18/2018     Priority: Medium    Suicidal ideation 10/18/2018     Priority: Medium    Attention deficit hyperactivity disorder (ADHD), combined type 02/26/2018     Priority: Medium    Generalized anxiety disorder 01/28/2013     Priority: Medium    Learning disability 10/18/2012     Priority: Medium    Premature birth 10/18/2012     Priority: Medium     Overview:   Born at 23 6/7 weeks gestation, weight 1 pound 1 ounce      Separation anxiety disorder 11/14/2011     Priority: Medium    Chronic rhinitis 07/23/2008     Priority: Medium       Current Outpatient Medications   Medication Sig Dispense Refill    ALPRAZolam (XANAX) 1 MG tablet Take 1 tablet by mouth 2 times daily      BYDUREON BCISE 2 MG/0.85ML auto-injector  (Patient not taking: Reported on 8/2/2023)      empagliflozin (JARDIANCE) 25 MG TABS tablet Take 25 mg by mouth      FLUoxetine (PROZAC) 40 MG capsule Take 60 mg by mouth daily      lamoTRIgine (LAMICTAL) 25 MG tablet Take 25 mg by mouth daily (Patient not taking: Reported on 8/2/2023)      lisinopril (ZESTRIL) 10 MG tablet Take 1 tablet by mouth daily      LORazepam (ATIVAN) 2 MG tablet Take 1 tablet (2 mg) by mouth every 6 hours as needed for anxiety      metFORMIN (GLUCOPHAGE) 500 MG tablet Take 1,000 mg by mouth 2 times daily (with meals)      OLANZapine zydis (ZYPREXA) 5 MG ODT Take 5 mg by mouth 2 times daily as needed      TRULICITY 1.5 MG/0.5ML pen Inject 1.5 mg Subcutaneous      UREA 20  "INTENSIVE HYDRATING 20 % external cream  (Patient not taking: Reported on 8/2/2023)      VRAYLAR 4.5 MG capsule        No current facility-administered medications for this visit.       Pertinent PMHx of ADHD, MDD, AMANDA, pre-term birth.    Reviewed scanned notes with Yarely Cook with the [facility] on 4/22/2024.  Here it was noted for history of hypoxia and admitted from April 13 through April 16, for acute hypoxic respiratory failure, viral pneumonia.  Was started on oral steroids which improved respiratory status, but had significant mental health side effects.  She apparently was discharged on supplemental oxygen at home, but appears this has been discontinued at this time.    STOP-BANG score of 6, with unknown neck circumference.  [place] score of 11.    BMI of Estimated body mass index is 40.74 kg/m  as calculated from the following:    Height as of 10/4/23: 1.6 m (5' 3\").    Weight as of 10/4/23: 104.3 kg (230 lb).     Chief concern per questionnaire: \" Quit breathing when sleeping, oxygen goes down when sleeping. \"    Duration of symptoms:  \"Just found out \"    Sleep pattern:  [place] - 10am.  Time to fall asleep: ~15 minutes.  Awakenings: 10 times per night, 10 minutes to return to sleep.  Average total sleep time:  ? hours  Napping.  7 days per week, 0.5 hours per nap.    No for RLS screen.  No for sleep walking.  No for dream enactment behavior.  No for bruxism.    Yes for morning headaches.  Yes for snoring.  Yes for observed apnea.  No for FHx of BRANDI.    Caffeine use:  Yes for 3+ per day.  Yes for within 6 hours of bed.    Tobacco use: Yes    SHx:  Lives with mother.    A/P:    1.)  High likelihood of BRANDI with STOP-BANG score of 6.  -Recent episode of acute hypoxic respiratory failure, presumed secondary to viral pneumonia.    Given that BMI is less than 45, I would be comfortable with either home sleep testing or in-lab PSG.    ---  This note was written with the assistance of the Dragon " voice-dictation technology software. The final document, although reviewed, may contain errors. For corrections, please contact the office.    Skyler Lawson MD    Sleep Medicine  [place], [place]  Main Office: [phone]  [facility] [address], [address]  Schedule visits: [phone]  Main Office: [phone]  Fax: [phone]

## 2024-07-07 ENCOUNTER — OFFICE VISIT (OUTPATIENT)
Dept: FAMILY MEDICINE | Facility: OTHER | Age: 24
End: 2024-07-07
Attending: STUDENT IN AN ORGANIZED HEALTH CARE EDUCATION/TRAINING PROGRAM
Payer: COMMERCIAL

## 2024-07-07 VITALS
OXYGEN SATURATION: 99 % | HEART RATE: 110 BPM | WEIGHT: 225 LBS | RESPIRATION RATE: 14 BRPM | BODY MASS INDEX: 39.86 KG/M2 | DIASTOLIC BLOOD PRESSURE: 80 MMHG | SYSTOLIC BLOOD PRESSURE: 132 MMHG | TEMPERATURE: 98.6 F

## 2024-07-07 DIAGNOSIS — R42 LIGHTHEADEDNESS: ICD-10-CM

## 2024-07-07 DIAGNOSIS — H65.191 ACUTE EFFUSION OF RIGHT EAR: Primary | ICD-10-CM

## 2024-07-07 LAB — HGB BLD-MCNC: 15.7 G/DL (ref 11.7–15.7)

## 2024-07-07 PROCEDURE — 36415 COLL VENOUS BLD VENIPUNCTURE: CPT | Mod: ZL | Performed by: STUDENT IN AN ORGANIZED HEALTH CARE EDUCATION/TRAINING PROGRAM

## 2024-07-07 PROCEDURE — 99213 OFFICE O/P EST LOW 20 MIN: CPT | Performed by: STUDENT IN AN ORGANIZED HEALTH CARE EDUCATION/TRAINING PROGRAM

## 2024-07-07 PROCEDURE — 85018 HEMOGLOBIN: CPT | Mod: ZL | Performed by: STUDENT IN AN ORGANIZED HEALTH CARE EDUCATION/TRAINING PROGRAM

## 2024-07-07 RX ORDER — LISINOPRIL 20 MG/1
TABLET ORAL
COMMUNITY
Start: 2024-03-14

## 2024-07-07 RX ORDER — AZITHROMYCIN 250 MG/1
TABLET, FILM COATED ORAL
COMMUNITY
Start: 2024-05-07

## 2024-07-07 RX ORDER — ARIPIPRAZOLE 10 MG/1
1 TABLET ORAL AT BEDTIME
COMMUNITY
Start: 2024-03-13

## 2024-07-07 RX ORDER — CLONAZEPAM 1 MG/1
1 TABLET ORAL DAILY
COMMUNITY
Start: 2024-04-03

## 2024-07-07 RX ORDER — CARIPRAZINE 3 MG/1
CAPSULE, GELATIN COATED ORAL
COMMUNITY
Start: 2024-06-19

## 2024-07-07 RX ORDER — PROCHLORPERAZINE 25 MG/1
SUPPOSITORY RECTAL
COMMUNITY
Start: 2023-08-25

## 2024-07-07 RX ORDER — TEMAZEPAM 7.5 MG/1
CAPSULE ORAL
COMMUNITY
Start: 2024-02-17

## 2024-07-07 RX ORDER — FLUVOXAMINE MALEATE 100 MG
1 TABLET ORAL DAILY
COMMUNITY
Start: 2024-04-03

## 2024-07-07 RX ORDER — OLANZAPINE AND SAMIDORPHAN L-MALATE 5; 10 MG/1; MG/1
1 TABLET, FILM COATED ORAL
COMMUNITY
Start: 2023-10-10

## 2024-07-07 RX ORDER — FLUVOXAMINE MALEATE 50 MG
TABLET ORAL
COMMUNITY
Start: 2024-03-26

## 2024-07-07 RX ORDER — AZITHROMYCIN 500 MG/1
TABLET, FILM COATED ORAL
COMMUNITY
Start: 2024-04-24

## 2024-07-07 RX ORDER — GABAPENTIN 100 MG/1
CAPSULE ORAL
COMMUNITY
Start: 2023-12-19

## 2024-07-07 RX ORDER — BLOOD SUGAR DIAGNOSTIC
STRIP MISCELLANEOUS
COMMUNITY
Start: 2024-03-08

## 2024-07-07 RX ORDER — BLOOD-GLUCOSE METER
EACH MISCELLANEOUS
COMMUNITY
Start: 2024-01-11

## 2024-07-07 RX ORDER — DULAGLUTIDE 4.5 MG/.5ML
INJECTION, SOLUTION SUBCUTANEOUS
COMMUNITY
Start: 2024-04-29

## 2024-07-07 RX ORDER — LANCETS
EACH MISCELLANEOUS
COMMUNITY
Start: 2024-03-08

## 2024-07-07 RX ORDER — KETOCONAZOLE 20 MG/ML
SHAMPOO TOPICAL
COMMUNITY
Start: 2023-12-06

## 2024-07-07 RX ORDER — NORGESTIMATE AND ETHINYL ESTRADIOL 7DAYSX3 LO
1 KIT ORAL DAILY
COMMUNITY
Start: 2023-12-18

## 2024-07-07 RX ORDER — PENICILLIN V POTASSIUM 500 MG/1
TABLET, FILM COATED ORAL
COMMUNITY
Start: 2024-04-22

## 2024-07-07 RX ORDER — AMLODIPINE BESYLATE 5 MG/1
1 TABLET ORAL DAILY
COMMUNITY
Start: 2024-03-25

## 2024-07-07 RX ORDER — PROPRANOLOL HYDROCHLORIDE 10 MG/1
TABLET ORAL
COMMUNITY
Start: 2024-02-13

## 2024-07-07 RX ORDER — DULAGLUTIDE 3 MG/.5ML
INJECTION, SOLUTION SUBCUTANEOUS
COMMUNITY
Start: 2023-08-16

## 2024-07-07 RX ORDER — PREDNISONE 20 MG/1
40 TABLET ORAL
COMMUNITY
Start: 2024-04-16

## 2024-07-07 RX ORDER — FLUCONAZOLE 150 MG/1
TABLET ORAL
COMMUNITY
Start: 2024-05-31

## 2024-07-07 RX ORDER — NORGESTIMATE AND ETHINYL ESTRADIOL 7DAYSX3 LO
1 KIT ORAL DAILY
COMMUNITY
Start: 2024-07-02

## 2024-07-07 RX ORDER — AMOXICILLIN 500 MG/1
CAPSULE ORAL
COMMUNITY
Start: 2024-05-02

## 2024-07-07 RX ORDER — NICOTINE 21 MG/24HR
1 PATCH, TRANSDERMAL 24 HOURS TRANSDERMAL
COMMUNITY
Start: 2024-04-16

## 2024-07-07 ASSESSMENT — PAIN SCALES - GENERAL: PAINLEVEL: NO PAIN (0)

## 2024-07-07 NOTE — PROGRESS NOTES
Assessment & Plan     (H65.191) Acute effusion of right ear  (primary encounter diagnosis)  Comment: Effusion of the right ear.  No evidence of infection.  No cerumen impaction.  Plan: Discussed trial of Flonase, meclizine as needed.  Warm fluids.  Continue to monitor.  Follow-up as needed.  Return to rapid clinic or ER if symptoms worsen or change.      (R42) Lightheadedness  Comment: Lightheadedness, hemoglobin was checked today due to recent heavy menstrual cycle.  This was 15.7.  This also may be related to viral illness.  Plan: Hemoglobin          Continue to monitor.  Follow-up as needed.  Go to the ER for worsening symptoms.  She is comfortable and agreeable with this plan.        Angelica Gillespie is a 24 year old, presenting for the following health issues:  Ear Problem (Right ear plugged, dizziness, ringing - feels as if there is air in ear)    HPI     Patient presents today with mom for concerns of her right ear feeling plugged, pressure, and some occasional dizziness.  She states that when she was laying on that side last night she had some ringing in her ear.  She did use some oil in the ear with no relief of symptoms.  She also notes she currently got off her menstruation, it was heavier than normal.  She notes that they were more clots, especially when she was using a tampon.  She notes that today her cycle is almost over.      Review of Systems  Constitutional, HEENT, cardiovascular, pulmonary, gi and gu systems are negative, except as otherwise noted.        Objective    /80 (BP Location: Right arm, Patient Position: Sitting, Cuff Size: Adult Regular)   Pulse 110   Temp 98.6  F (37  C) (Temporal)   Resp 14   Wt 102.1 kg (225 lb)   LMP 07/03/2024 (Approximate)   SpO2 99%   Breastfeeding No   BMI 39.86 kg/m    Body mass index is 39.86 kg/m .    Physical Exam   GENERAL: alert and no distress  EYES: Eyes grossly normal to inspection, PERRL and conjunctivae and sclerae normal  HENT:  ear canals clear, right TM with slight dulling, no erythema, left ear clear, nose and mouth without ulcers or lesions  NECK: no adenopathy, no asymmetry, masses, or scars  RESP: lungs clear to auscultation - no rales, rhonchi or wheezes  CV: regular rate and rhythm  MS: no gross musculoskeletal defects noted, no edema    Results for orders placed or performed in visit on 07/07/24   Hemoglobin     Status: Normal   Result Value Ref Range    Hemoglobin 15.7 11.7 - 15.7 g/dL         Signed Electronically by: Colleen Ayala PA-C

## 2024-07-07 NOTE — NURSING NOTE
"Chief Complaint   Patient presents with    Ear Problem     Right ear plugged, dizziness, ringing - feels as if there is air in ear       Initial /80 (BP Location: Right arm, Patient Position: Sitting, Cuff Size: Adult Regular)   Pulse 110   Temp 98.6  F (37  C) (Temporal)   Resp 14   Wt 102.1 kg (225 lb)   LMP 07/03/2024 (Approximate)   SpO2 99%   Breastfeeding No   BMI 39.86 kg/m   Estimated body mass index is 39.86 kg/m  as calculated from the following:    Height as of 10/4/23: 1.6 m (5' 3\").    Weight as of this encounter: 102.1 kg (225 lb).    Medication Review: complete        Jane Katz LPN      "

## 2024-07-07 NOTE — PATIENT INSTRUCTIONS
Right ear effusion    Consider Flonase nasal spray.    Meclizine (Antivert) for dizziness.    Lots of fluids.    Light activity.    Slow changing in positions such as from laying to sitting to standing.    Follow-up as needed.

## 2024-07-15 ENCOUNTER — OFFICE VISIT (OUTPATIENT)
Dept: FAMILY MEDICINE | Facility: OTHER | Age: 24
End: 2024-07-15
Payer: COMMERCIAL

## 2024-07-15 VITALS
TEMPERATURE: 99.3 F | SYSTOLIC BLOOD PRESSURE: 146 MMHG | HEART RATE: 97 BPM | RESPIRATION RATE: 14 BRPM | OXYGEN SATURATION: 96 % | BODY MASS INDEX: 40.61 KG/M2 | WEIGHT: 229.2 LBS | DIASTOLIC BLOOD PRESSURE: 93 MMHG | HEIGHT: 63 IN

## 2024-07-15 DIAGNOSIS — E66.01 MORBID OBESITY (H): ICD-10-CM
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
## 2024-07-15 DIAGNOSIS — L29.9 ITCHING: ICD-10-CM

## 2024-07-15 DIAGNOSIS — I10 ESSENTIAL HYPERTENSION: ICD-10-CM

## 2024-07-15 DIAGNOSIS — R21 RASH: Primary | ICD-10-CM

## 2024-07-15 PROCEDURE — 99214 OFFICE O/P EST MOD 30 MIN: CPT

## 2024-07-15 RX ORDER — PREDNISONE 20 MG/1
20 TABLET ORAL DAILY
Qty: 5 TABLET | Refills: 0 | Status: SHIPPED | OUTPATIENT
Start: 2024-07-15 | End: 2024-07-20

## 2024-07-15 RX ORDER — TRIAMCINOLONE ACETONIDE 1 MG/G
CREAM TOPICAL 2 TIMES DAILY
Qty: 30 G | Refills: 0 | Status: SHIPPED | OUTPATIENT
Start: 2024-07-15

## 2024-07-15 RX ORDER — CETIRIZINE HYDROCHLORIDE 10 MG/1
10 TABLET ORAL DAILY
Qty: 14 TABLET | Refills: 0 | Status: SHIPPED | OUTPATIENT
Start: 2024-07-15 | End: 2024-07-29

## 2024-07-15 ASSESSMENT — PATIENT HEALTH QUESTIONNAIRE - PHQ9
SUM OF ALL RESPONSES TO PHQ QUESTIONS 1-9: 0
10. IF YOU CHECKED OFF ANY PROBLEMS, HOW DIFFICULT HAVE THESE PROBLEMS MADE IT FOR YOU TO DO YOUR WORK, TAKE CARE OF THINGS AT HOME, OR GET ALONG WITH OTHER PEOPLE: NOT DIFFICULT AT ALL
SUM OF ALL RESPONSES TO PHQ QUESTIONS 1-9: 0

## 2024-07-15 ASSESSMENT — PAIN SCALES - GENERAL: PAINLEVEL: NO PAIN (0)

## 2024-07-15 NOTE — PROGRESS NOTES
ASSESSMENT/PLAN:    I have reviewed the nursing notes.  I have reviewed the findings, diagnosis, plan and need for follow up with the patient.    1. Sun exposure, mild, initial encounter  2. Itching  3. Rash    - predniSONE (DELTASONE) 20 MG tablet; Take 1 tablet (20 mg) by mouth daily for 5 days  Dispense: 5 tablet; Refill: 0    - triamcinolone (KENALOG) 0.1 % external cream; Apply topically 2 times daily  Dispense: 30 g; Refill: 0    - cetirizine (ZYRTEC) 10 MG tablet; Take 1 tablet (10 mg) by mouth daily for 14 days  Dispense: 14 tablet; Refill: 0    - Please read the attached information on rash and protecting your skin from the sun for at home care treatment as discussed in the clinic today.    - May use over-the-counter Tylenol or ibuprofen as needed for pain, inflammation or fever    4. Essential hypertension    - Please read the attached information on emergency or urgency hypertension.    - Recommend follow-up with PCP in regards to ongoing elevated blood pressures.    - Discussed in clinic today healthy diet, exercise and weight loss to help control blood pressure.    5. Morbid obesity (H)    - BMI 40.60 today in clinic.  Discussed healthy diet and exercise today in clinic.    - Discussed warning signs/symptoms indicative of need to f/u    - Follow up if symptoms persist or worsen or concerns    - I explained my diagnostic considerations and recommendations to the patient, who voiced understanding and agreement with the treatment plan. All questions were answered. We discussed potential side effects of any prescribed or recommended therapies, as well as expectations for response to treatments.    SUKUMAR Gonzales CNP  7/15/2024  5:25 PM    HPI:    Verna Kauffman is a 24 year old female who [facility] for concerns of rash on legs and legs that itches.  Has had rash for a couple of days.  Patient states that she has been taking Benadryl only and states that it helps the itch  some.  Patient states that she has been in the sun and swimming.     Past Medical History:   Diagnosis Date    Anxiety      Past Surgical History:   Procedure Laterality Date    CARDIAC SURGERY  2000    HC REMOVE TONSILS/ADENOIDS,<11 Y/O  12/26/11     Social History     Tobacco Use    Smoking status: Former     Types: Cigarettes     Passive exposure: Current (Sometimes)    Smokeless tobacco: Never    Tobacco comments:     vape   Substance Use Topics    Alcohol use: Yes     Current Outpatient Medications   Medication Sig Dispense Refill    ACCU-CHEK GUIDE test strip       ALPRAZolam (XANAX) 1 MG tablet Take 1 tablet by mouth 2 times daily      amLODIPine (NORVASC) 5 MG tablet Take 1 tablet by mouth daily      ARIPiprazole (ABILIFY) 10 MG tablet Take 1 tablet by mouth at bedtime      blood glucose monitoring (SOFTCLIX) lancets       Blood Glucose Monitoring Suppl (ACCU-CHEK GUIDE) w/Device KIT       KIA BCISE 2 MG/0.85ML auto-injector       cetirizine (ZYRTEC) 10 MG tablet Take 1 tablet (10 mg) by mouth daily for 14 days 14 tablet 0    clonazePAM (KLONOPIN) 1 MG tablet Take 1 tablet by mouth daily      Continuous Glucose Transmitter (DEXCOM G6 TRANSMITTER) MISC       empagliflozin (JARDIANCE) 25 MG TABS tablet Take 25 mg by mouth      fluconazole (DIFLUCAN) 150 MG tablet       FLUoxetine (PROZAC) 40 MG capsule Take 60 mg by mouth daily      fluvoxaMINE (LUVOX) 100 MG tablet Take 1 tablet by mouth daily      gabapentin (NEURONTIN) 100 MG capsule       ketoconazole (NIZORAL) 2 % external shampoo       lamoTRIgine (LAMICTAL) 25 MG tablet Take 25 mg by mouth daily      lisinopril (ZESTRIL) 10 MG tablet Take 1 tablet by mouth daily      lisinopril (ZESTRIL) 20 MG tablet       LORazepam (ATIVAN) 2 MG tablet Take 1 tablet (2 mg) by mouth every 6 hours as needed for anxiety      LYBALVI 5-10 MG tablet Take 1 tablet by mouth daily at 2 pm      nicotine (NICODERM CQ) 21 MG/24HR 24 hr patch Place 1 patch onto the skin       norgestim-eth estrad triphasic (ORTHO TRI-CYCLEN LO) 0.18/0.215/0.25 MG-25 MCG tablet Take 1 tablet by mouth daily      norgestim-eth estrad triphasic (ORTHO TRI-CYCLEN LO) 0.18/0.215/0.25 MG-25 MCG tablet Take 1 tablet by mouth daily      OLANZapine zydis (ZYPREXA) 5 MG ODT Take 5 mg by mouth 2 times daily as needed      predniSONE (DELTASONE) 20 MG tablet Take 1 tablet (20 mg) by mouth daily for 5 days 5 tablet 0    propranolol (INDERAL) 10 MG tablet       semaglutide (OZEMPIC) 2 MG/3ML pen Inject 0.25 mg Subcutaneous      temazepam (RESTORIL) 7.5 MG capsule       triamcinolone (KENALOG) 0.1 % external cream Apply topically 2 times daily 30 g 0    VRAYLAR 3 MG capsule       amoxicillin (AMOXIL) 500 MG capsule  (Patient not taking: Reported on 7/7/2024)      azithromycin (ZITHROMAX) 250 MG tablet  (Patient not taking: Reported on 7/7/2024)      azithromycin (ZITHROMAX) 500 MG tablet  (Patient not taking: Reported on 7/7/2024)      fluvoxaMINE (LUVOX) 50 MG tablet  (Patient not taking: Reported on 7/7/2024)      metFORMIN (GLUCOPHAGE) 500 MG tablet Take 1,000 mg by mouth 2 times daily (with meals) (Patient not taking: Reported on 7/7/2024)      penicillin V (VEETID) 500 MG tablet  (Patient not taking: Reported on 7/7/2024)      predniSONE (DELTASONE) 20 MG tablet Take 40 mg by mouth (Patient not taking: Reported on 7/7/2024)      TRULICITY 1.5 MG/0.5ML pen Inject 1.5 mg Subcutaneous (Patient not taking: Reported on 7/7/2024)      TRULICITY 3 MG/0.5ML SOPN  (Patient not taking: Reported on 7/7/2024)      TRULICITY 4.5 MG/0.5ML SOPN  (Patient not taking: Reported on 7/7/2024)      UREA 20 INTENSIVE HYDRATING 20 % external cream  (Patient not taking: Reported on 8/2/2023)      VRAYLAR 4.5 MG capsule  (Patient not taking: Reported on 7/7/2024)       Allergies   Allergen Reactions    Liraglutide Diarrhea    Clavulanic Acid     Depakote [Valproic Acid]      Makes her to sedate    Metronidazole Other (See Comments)      "Psychosis    Trazodone Other (See Comments)     Induced noemy     Past medical history, past surgical history, current medications and allergies reviewed and accurate to the best of my knowledge.      ROS:  Refer to HPI    BP (!) 146/93 (BP Location: Left arm, Patient Position: Sitting, Cuff Size: Adult Regular)   Pulse 97   Temp 99.3  F (37.4  C) (Tympanic)   Resp 14   Ht 1.6 m (5' 3\")   Wt 104 kg (229 lb 3.2 oz)   LMP 07/03/2024 (Approximate)   SpO2 96%   BMI 40.60 kg/m      EXAM:  General Appearance: Well appearing 24 year old female, appropriate appearance for age. No acute distress   Respiratory: normal chest wall and respirations.  Normal effort.  Clear to auscultation bilaterally, no wheezing, crackles or rhonchi.  No increased work of breathing.  No cough appreciated.  Cardiac: RRR with no murmurs  Musculoskeletal:  Equal movement of bilateral upper extremities.  Equal movement of bilateral lower extremities.  Normal gait.    Dermatological: See attached pictures for details  Neuro: Alert and oriented to person, place, and time.    Psychological: normal affect, alert, oriented, and pleasant.           Answers submitted by the patient for this visit:  Patient Health Questionnaire (Submitted on 7/15/2024)  If you checked off any problems, how difficult have these problems made it for you to do your work, take care of things at home, or get along with other people?: Not difficult at all  PHQ9 TOTAL SCORE: 0    "

## 2024-07-16 PROBLEM — I10 ESSENTIAL HYPERTENSION: Status: ACTIVE | Noted: 2024-03-15

## 2024-07-16 PROBLEM — E66.01 MORBID OBESITY (H): Status: ACTIVE | Noted: 2023-01-11

## 2024-08-30 ENCOUNTER — TRANSFERRED RECORDS (OUTPATIENT)
Dept: MULTI SPECIALTY CLINIC | Facility: CLINIC | Age: 24
End: 2024-08-30
Payer: COMMERCIAL

## 2024-09-07 ENCOUNTER — HEALTH MAINTENANCE LETTER (OUTPATIENT)
Age: 24
End: 2024-09-07

## 2024-11-26 ENCOUNTER — HOSPITAL ENCOUNTER (EMERGENCY)
Facility: HOSPITAL | Age: 24
Discharge: LEFT BEFORE DISCHARGE | End: 2024-11-26

## 2024-11-26 VITALS
DIASTOLIC BLOOD PRESSURE: 96 MMHG | RESPIRATION RATE: 18 BRPM | HEART RATE: 107 BPM | OXYGEN SATURATION: 99 % | TEMPERATURE: 98 F | SYSTOLIC BLOOD PRESSURE: 142 MMHG

## 2024-11-26 ASSESSMENT — COLUMBIA-SUICIDE SEVERITY RATING SCALE - C-SSRS
1. IN THE PAST MONTH, HAVE YOU WISHED YOU WERE DEAD OR WISHED YOU COULD GO TO SLEEP AND NOT WAKE UP?: NO
6. HAVE YOU EVER DONE ANYTHING, STARTED TO DO ANYTHING, OR PREPARED TO DO ANYTHING TO END YOUR LIFE?: NO
2. HAVE YOU ACTUALLY HAD ANY THOUGHTS OF KILLING YOURSELF IN THE PAST MONTH?: NO

## 2024-11-26 NOTE — ED NOTES
Patient reports that she would like to leave, as she is feeling better. Declination of Medical Screening Exam form explained and signed by patient.

## 2024-11-26 NOTE — ED TRIAGE NOTES
"\"I was put on Cobenfy 2 days ago for hearing voices.  I am also on Ozempic for about 6-7 months and the dosage was just increased to 2 mg a couple of weeks ago.  I am dizzy, nauseated, and having blurred vision.  No vomiting.\"          "

## 2024-11-27 ENCOUNTER — TELEPHONE (OUTPATIENT)
Dept: EMERGENCY MEDICINE | Facility: HOSPITAL | Age: 24
End: 2024-11-27

## 2024-11-27 NOTE — ED NOTES
Care Transitions focused note:      Follow up call on patient who left without being seen after triage.  Pt presented with hearing voices, dizziness, blurred vision and nausea.    Called patient.  No answer.  VM left with my name, contact number and reason I was calling.    Will await call back.    OLIVE Macias

## 2024-12-04 ENCOUNTER — TRANSFERRED RECORDS (OUTPATIENT)
Dept: HEALTH INFORMATION MANAGEMENT | Facility: OTHER | Age: 24
End: 2024-12-04

## 2024-12-21 ENCOUNTER — OFFICE VISIT (OUTPATIENT)
Dept: FAMILY MEDICINE | Facility: OTHER | Age: 24
End: 2024-12-21
Attending: STUDENT IN AN ORGANIZED HEALTH CARE EDUCATION/TRAINING PROGRAM
Payer: COMMERCIAL

## 2024-12-21 VITALS
HEIGHT: 63 IN | BODY MASS INDEX: 39.16 KG/M2 | RESPIRATION RATE: 16 BRPM | WEIGHT: 221 LBS | OXYGEN SATURATION: 98 % | SYSTOLIC BLOOD PRESSURE: 128 MMHG | DIASTOLIC BLOOD PRESSURE: 86 MMHG | HEART RATE: 88 BPM | TEMPERATURE: 98.9 F

## 2024-12-21 DIAGNOSIS — F42.9 OBSESSIVE-COMPULSIVE DISORDER, UNSPECIFIED TYPE: Primary | ICD-10-CM

## 2024-12-21 RX ORDER — FLUVOXAMINE MALEATE 100 MG
100 TABLET ORAL AT BEDTIME
Qty: 3 TABLET | Refills: 0 | Status: SHIPPED | OUTPATIENT
Start: 2024-12-21

## 2024-12-21 RX ORDER — AMLODIPINE BESYLATE 10 MG/1
TABLET ORAL
COMMUNITY
Start: 2024-10-31

## 2024-12-21 RX ORDER — SEMAGLUTIDE 1.34 MG/ML
2 INJECTION, SOLUTION SUBCUTANEOUS WEEKLY
COMMUNITY
Start: 2024-10-31

## 2024-12-21 ASSESSMENT — PAIN SCALES - GENERAL: PAINLEVEL_OUTOF10: NO PAIN (0)

## 2024-12-21 NOTE — NURSING NOTE
"Chief Complaint   Patient presents with    Medication Request      Luvox refill - provider ok'd     Patient in clinic with mom  Requesting refill as she missed pharmacy hours when her RX was sent from [facility].    Initial /86 (BP Location: Right arm, Patient Position: Sitting, Cuff Size: Adult Regular)   Pulse 88   Temp 98.9  F (37.2  C) (Tympanic)   Resp 16   Ht 1.6 m (5' 3\")   Wt 100.2 kg (221 lb)   LMP 12/06/2024 (Approximate)   SpO2 98%   BMI 39.15 kg/m   Estimated body mass index is 39.15 kg/m  as calculated from the following:    Height as of this encounter: 1.6 m (5' 3\").    Weight as of this encounter: 100.2 kg (221 lb).     Advance Care Directive on file? y    FOOD SECURITY SCREENING QUESTIONS:    The next two questions are to help us understand your food security.  If you are feeling you need any assistance in this area, we have resources available to support you today.    Hunger Vital Signs:  Within the past 12 months we worried whether our food would run out before we got money to buy more. Never  Within the past 12 months the food we bought just didn't last and we didn't have money to get more. Never  Yessica Hammer LPN,LPN on 12/21/2024 at 4:02 PM      Yessica Hammer LPN     "

## 2024-12-21 NOTE — PROGRESS NOTES
ASSESSMENT/PLAN:    I have reviewed the nursing notes.  I have reviewed the findings, diagnosis, plan and need for follow up with the patient.    1. Obsessive-compulsive disorder, unspecified type (Primary)  - fluvoxaMINE (LUVOX) 100 MG tablet; Take 1 tablet (100 mg) by mouth at bedtime.  Dispense: 3 tablet; Refill: 0    Provided patient with a short fill of her Luvox to get her through the weekend until she is able to get to [facility] pharmacy when it reopens on Monday.  Continue to take medication as prescribed and follow-up with mental health provider as scheduled.     Discussed warning signs/symptoms indicative of need to f/u    Follow up if symptoms persist or worsen or concerns    I explained my diagnostic considerations and recommendations to the patient, who voiced understanding and agreement with the treatment plan. All questions were answered. We discussed potential side effects of any prescribed or recommended therapies, as well as expectations for response to treatments.    SUKUMAR Segundo CNP  12/21/2024  4:09 PM    HPI:    Verna Kauffman is a 24 year old female accompanied by her mother who presents to [facility] today for medication refill    Patient states that she needs a short refill of her Luvox.  She states that it was recently filled by her mental health provider at the [facility] pharmacy but the pharmacy closed before she could pick it up today.  Patient states that this medication is filled and managed by Gayatri Newsome at [facility].  Patient states that she takes it for obsessive-compulsive disorder.  She states her last dose was a few days ago.  Patient is looking for a short fill until she is able to  her normal refill at [facility] pharmacy on Monday.    Past Medical History:   Diagnosis Date    Anxiety      Past Surgical History:   Procedure Laterality Date    CARDIAC SURGERY  2000    HC REMOVE TONSILS/ADENOIDS,<11 Y/O  12/26/11     Social History      Tobacco Use    Smoking status: Former     Types: Cigarettes     Passive exposure: Current (Sometimes)    Smokeless tobacco: Never    Tobacco comments:     vape   Substance Use Topics    Alcohol use: Yes     Current Outpatient Medications   Medication Sig Dispense Refill    ACCU-CHEK GUIDE test strip       amLODIPine (NORVASC) 10 MG tablet       amLODIPine (NORVASC) 5 MG tablet Take 1 tablet by mouth daily      blood glucose monitoring (SOFTCLIX) lancets       Blood Glucose Monitoring Suppl (ACCU-CHEK GUIDE) w/Device KIT       BYDUREON BCISE 2 MG/0.85ML auto-injector       clonazePAM (KLONOPIN) 1 MG tablet Take 1 tablet by mouth daily      Continuous Glucose Transmitter (DEXCOM G6 TRANSMITTER) MISC       fluvoxaMINE (LUVOX) 100 MG tablet Take 1 tablet by mouth daily      norgestim-eth estrad triphasic (ORTHO TRI-CYCLEN LO) 0.18/0.215/0.25 MG-25 MCG tablet Take 1 tablet by mouth daily      OZEMPIC, 1 MG/DOSE, 4 MG/3ML pen Inject 2 mg subcutaneously once a week.      ALPRAZolam (XANAX) 1 MG tablet Take 1 tablet by mouth 2 times daily (Patient not taking: Reported on 12/21/2024)      amoxicillin (AMOXIL) 500 MG capsule  (Patient not taking: Reported on 12/21/2024)      ARIPiprazole (ABILIFY) 10 MG tablet Take 1 tablet by mouth at bedtime (Patient not taking: Reported on 12/21/2024)      azithromycin (ZITHROMAX) 250 MG tablet  (Patient not taking: Reported on 12/21/2024)      azithromycin (ZITHROMAX) 500 MG tablet  (Patient not taking: Reported on 12/21/2024)      empagliflozin (JARDIANCE) 25 MG TABS tablet Take 25 mg by mouth (Patient not taking: Reported on 12/21/2024)      fluconazole (DIFLUCAN) 150 MG tablet  (Patient not taking: Reported on 12/21/2024)      FLUoxetine (PROZAC) 40 MG capsule Take 60 mg by mouth daily (Patient not taking: Reported on 12/21/2024)      fluvoxaMINE (LUVOX) 50 MG tablet  (Patient not taking: Reported on 12/21/2024)      gabapentin (NEURONTIN) 100 MG capsule  (Patient not taking:  Reported on 12/21/2024)      ketoconazole (NIZORAL) 2 % external shampoo  (Patient not taking: Reported on 12/21/2024)      lamoTRIgine (LAMICTAL) 25 MG tablet Take 25 mg by mouth daily (Patient not taking: Reported on 12/21/2024)      lisinopril (ZESTRIL) 10 MG tablet Take 1 tablet by mouth daily (Patient not taking: Reported on 12/21/2024)      lisinopril (ZESTRIL) 20 MG tablet  (Patient not taking: Reported on 12/21/2024)      LORazepam (ATIVAN) 2 MG tablet Take 1 tablet (2 mg) by mouth every 6 hours as needed for anxiety (Patient not taking: Reported on 12/21/2024)      LYBALVI 5-10 MG tablet Take 1 tablet by mouth daily at 2 pm (Patient not taking: Reported on 12/21/2024)      metFORMIN (GLUCOPHAGE) 500 MG tablet Take 1,000 mg by mouth 2 times daily (with meals) (Patient not taking: Reported on 12/21/2024)      nicotine (NICODERM CQ) 21 MG/24HR 24 hr patch Place 1 patch onto the skin (Patient not taking: Reported on 12/21/2024)      norgestim-eth estrad triphasic (ORTHO TRI-CYCLEN LO) 0.18/0.215/0.25 MG-25 MCG tablet Take 1 tablet by mouth daily (Patient not taking: Reported on 12/21/2024)      OLANZapine zydis (ZYPREXA) 5 MG ODT Take 5 mg by mouth 2 times daily as needed (Patient not taking: Reported on 12/21/2024)      penicillin V (VEETID) 500 MG tablet  (Patient not taking: Reported on 12/21/2024)      predniSONE (DELTASONE) 20 MG tablet Take 40 mg by mouth (Patient not taking: Reported on 12/21/2024)      propranolol (INDERAL) 10 MG tablet  (Patient not taking: Reported on 12/21/2024)      temazepam (RESTORIL) 7.5 MG capsule  (Patient not taking: Reported on 12/21/2024)      triamcinolone (KENALOG) 0.1 % external cream Apply topically 2 times daily (Patient not taking: Reported on 12/21/2024) 30 g 0    TRULICITY 1.5 MG/0.5ML pen Inject 1.5 mg Subcutaneous (Patient not taking: Reported on 12/21/2024)      TRULICITY 3 MG/0.5ML SOPN  (Patient not taking: Reported on 12/21/2024)      TRULICITY 4.5 MG/0.5ML  "SOPN  (Patient not taking: Reported on 12/21/2024)      UREA 20 INTENSIVE HYDRATING 20 % external cream  (Patient not taking: Reported on 12/21/2024)      VRAYLAR 3 MG capsule  (Patient not taking: Reported on 12/21/2024)      VRAYLAR 4.5 MG capsule  (Patient not taking: Reported on 12/21/2024)       Allergies   Allergen Reactions    Liraglutide Diarrhea    Clavulanic Acid     Depakote [Valproic Acid]      Makes her to sedate    Metronidazole Other (See Comments)     Psychosis    Trazodone Other (See Comments)     Induced noemy     Past medical history, past surgical history, current medications and allergies reviewed and accurate to the best of my knowledge.      ROS:  Refer to HPI    /86 (BP Location: Right arm, Patient Position: Sitting, Cuff Size: Adult Regular)   Pulse 88   Temp 98.9  F (37.2  C) (Tympanic)   Resp 16   Ht 1.6 m (5' 3\")   Wt 100.2 kg (221 lb)   LMP 12/06/2024 (Approximate)   SpO2 98%   BMI 39.15 kg/m      EXAM:  General Appearance: Well appearing 24 year old female, appropriate appearance for age. No acute distress   Neuro: Alert and oriented to person, place, and time. Muscle tone normal.  Gait normal. No tremor.   Psychological: normal affect, alert, oriented, and pleasant.       "

## 2025-05-01 ENCOUNTER — TRANSFERRED RECORDS (OUTPATIENT)
Dept: HEALTH INFORMATION MANAGEMENT | Facility: OTHER | Age: 25
End: 2025-05-01

## 2025-06-17 ENCOUNTER — OFFICE VISIT (OUTPATIENT)
Dept: PSYCHIATRY | Facility: OTHER | Age: 25
End: 2025-06-17
Attending: NURSE PRACTITIONER
Payer: COMMERCIAL

## 2025-06-17 VITALS
WEIGHT: 220.4 LBS | TEMPERATURE: 99.3 F | BODY MASS INDEX: 39.04 KG/M2 | DIASTOLIC BLOOD PRESSURE: 83 MMHG | SYSTOLIC BLOOD PRESSURE: 148 MMHG | HEART RATE: 108 BPM | OXYGEN SATURATION: 97 % | RESPIRATION RATE: 20 BRPM

## 2025-06-17 DIAGNOSIS — F43.10 PTSD (POST-TRAUMATIC STRESS DISORDER): ICD-10-CM

## 2025-06-17 DIAGNOSIS — F42.2 MIXED OBSESSIONAL THOUGHTS AND ACTS: Primary | ICD-10-CM

## 2025-06-17 DIAGNOSIS — F31.81 BIPOLAR 2 DISORDER (H): ICD-10-CM

## 2025-06-17 PROBLEM — F32.3: Status: RESOLVED | Noted: 2018-10-18 | Resolved: 2025-06-17

## 2025-06-17 PROCEDURE — G0463 HOSPITAL OUTPT CLINIC VISIT: HCPCS | Performed by: NURSE PRACTITIONER

## 2025-06-17 RX ORDER — FLUVOXAMINE MALEATE 100 MG/1
200 CAPSULE, EXTENDED RELEASE ORAL AT BEDTIME
Qty: 60 CAPSULE | Refills: 1 | Status: SHIPPED | OUTPATIENT
Start: 2025-06-17

## 2025-06-17 ASSESSMENT — ANXIETY QUESTIONNAIRES
3. WORRYING TOO MUCH ABOUT DIFFERENT THINGS: SEVERAL DAYS
4. TROUBLE RELAXING: SEVERAL DAYS
6. BECOMING EASILY ANNOYED OR IRRITABLE: MORE THAN HALF THE DAYS
7. FEELING AFRAID AS IF SOMETHING AWFUL MIGHT HAPPEN: MORE THAN HALF THE DAYS
7. FEELING AFRAID AS IF SOMETHING AWFUL MIGHT HAPPEN: MORE THAN HALF THE DAYS
1. FEELING NERVOUS, ANXIOUS, OR ON EDGE: SEVERAL DAYS
2. NOT BEING ABLE TO STOP OR CONTROL WORRYING: SEVERAL DAYS
GAD7 TOTAL SCORE: 10
5. BEING SO RESTLESS THAT IT IS HARD TO SIT STILL: MORE THAN HALF THE DAYS
IF YOU CHECKED OFF ANY PROBLEMS ON THIS QUESTIONNAIRE, HOW DIFFICULT HAVE THESE PROBLEMS MADE IT FOR YOU TO DO YOUR WORK, TAKE CARE OF THINGS AT HOME, OR GET ALONG WITH OTHER PEOPLE: SOMEWHAT DIFFICULT
GAD7 TOTAL SCORE: 10
8. IF YOU CHECKED OFF ANY PROBLEMS, HOW DIFFICULT HAVE THESE MADE IT FOR YOU TO DO YOUR WORK, TAKE CARE OF THINGS AT HOME, OR GET ALONG WITH OTHER PEOPLE?: SOMEWHAT DIFFICULT
GAD7 TOTAL SCORE: 10

## 2025-06-17 ASSESSMENT — PAIN SCALES - GENERAL: PAINLEVEL_OUTOF10: NO PAIN (0)

## 2025-06-17 ASSESSMENT — PATIENT HEALTH QUESTIONNAIRE - PHQ9
SUM OF ALL RESPONSES TO PHQ QUESTIONS 1-9: 10
SUM OF ALL RESPONSES TO PHQ QUESTIONS 1-9: 10
10. IF YOU CHECKED OFF ANY PROBLEMS, HOW DIFFICULT HAVE THESE PROBLEMS MADE IT FOR YOU TO DO YOUR WORK, TAKE CARE OF THINGS AT HOME, OR GET ALONG WITH OTHER PEOPLE: SOMEWHAT DIFFICULT

## 2025-06-17 NOTE — PATIENT INSTRUCTIONS
"Verna, it was a pleasure seeing you today. As we discussed, we will do the following:    Medication:   Increase Luvox CR to 200mg at bed  Continue Vraylar 3mg at bed    Psychotherapy: None  Labs: No labs today - reviewed labs completed on 3/4/25 - medical addressing abnormalities.  F/U: 3 weeks        Call the clinic with medication questions or concerns at [phone] or send a [employer] message. [place] may be used to communicate with your provider, but this is not intended to be used for emergencies.     Crisis Resources for [facility]: First Call for Help: (211), [facility] Crisis Line available 24/7: ([phone])  [facility] [facility]: National Crisis Text Line: text HOME to 619030    Crisis Resources for [place]: Adult Mental Health crisis: [phone], [place] Crisis Text Line: text \"[place]\" or \"HOME\" to 090178    Therapy Options in [place], [place] and Surrounding Area:    Frankie Valerio, [facility] - ([phone])    [facility] Services - Naveen Aguilar ([phone])    Rebeca Guadalupe, [facility], family and individual therapy- ([phone])    [facility] - specializes in women and adolescent therapy - ([phone])    [facility] - EMDR, trauma, etc. ([phone])    [facility] - spiritual based support groups ([phone])    [facility] - adults, adolescents and children ([phone])    [facility] - several different therapy options adults and children ([phone])    [facility] - several options, one of the largest mental health providers in the area - ([phone])    [facility] - ([phone])    [facility] - offers several therapy options including 8-week \"express\" therapy program - ([phone])    [facility] - ([phone])    [facility] - adult therapy ([phone])    [facility] - ([phone])    [facility] - ([phone])    [facility] - ([phone])    [facility] - ([phone])    [facility], [place] - ([phone])    [facility], [place] - [phone]    [facility], [place] - [phone]    [facility], [place] [phone]

## 2025-06-17 NOTE — NURSING NOTE
"Chief Complaint   Patient presents with    med managment    Anxiety     Patient presents to clinic for med management and anxiety.  Initial BP (!) 148/83 (BP Location: Right arm, Patient Position: Sitting, Cuff Size: Adult Regular)   Pulse 108   Temp 99.3  F (37.4  C) (Tympanic)   Resp 20   Wt 100 kg (220 lb 6.4 oz)   LMP 06/03/2025 (Approximate)   SpO2 97%   BMI 39.04 kg/m   Estimated body mass index is 39.04 kg/m  as calculated from the following:    Height as of 12/21/24: 1.6 m (5' 3\").    Weight as of this encounter: 100 kg (220 lb 6.4 oz).  Medication Review: complete    The next two questions are to help us understand your food security.  If you are feeling you need any assistance in this area, we have resources available to support you today.          6/17/2025   SDOH- Food Insecurity   Within the past 12 months, did you worry that your food would run out before you got money to buy more? N   Within the past 12 months, did the food you bought just not last and you didn t have money to get more? N         Sera Adair      "
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
## 2025-07-23 ENCOUNTER — OFFICE VISIT (OUTPATIENT)
Dept: PSYCHIATRY | Facility: OTHER | Age: 25
End: 2025-07-23
Attending: NURSE PRACTITIONER
Payer: COMMERCIAL

## 2025-07-23 VITALS
DIASTOLIC BLOOD PRESSURE: 87 MMHG | HEART RATE: 113 BPM | SYSTOLIC BLOOD PRESSURE: 145 MMHG | TEMPERATURE: 98.9 F | BODY MASS INDEX: 38.76 KG/M2 | WEIGHT: 218.8 LBS | OXYGEN SATURATION: 97 % | RESPIRATION RATE: 24 BRPM

## 2025-07-23 DIAGNOSIS — F43.10 PTSD (POST-TRAUMATIC STRESS DISORDER): ICD-10-CM

## 2025-07-23 DIAGNOSIS — F42.2 MIXED OBSESSIONAL THOUGHTS AND ACTS: ICD-10-CM

## 2025-07-23 DIAGNOSIS — F40.10 SOCIAL ANXIETY DISORDER: Primary | ICD-10-CM

## 2025-07-23 PROCEDURE — G0463 HOSPITAL OUTPT CLINIC VISIT: HCPCS

## 2025-07-23 RX ORDER — CLONAZEPAM 1 MG/1
.5-1 TABLET ORAL DAILY PRN
Qty: 15 TABLET | Refills: 2 | Status: SHIPPED | OUTPATIENT
Start: 2025-07-23

## 2025-07-23 ASSESSMENT — PAIN SCALES - GENERAL: PAINLEVEL_OUTOF10: NO PAIN (0)

## 2025-07-23 NOTE — NURSING NOTE
"Chief Complaint   Patient presents with    Medication Therapy Management   Patient presents to the Clinic today for medication management. Patient's whole body was trembling during BP acquisition and reported that she has been experiencing that on and off since starting her GLP1. Patient states her shaking is worse when she is lying down or feeling anxious.    Initial BP (!) 145/87 (BP Location: Right arm, Patient Position: Sitting, Cuff Size: Adult Regular)   Pulse 113   Temp 98.9  F (37.2  C) (Tympanic)   Resp 24   Wt 99.2 kg (218 lb 12.8 oz)   LMP 06/03/2025 (Approximate)   SpO2 97%   BMI 38.76 kg/m   Estimated body mass index is 38.76 kg/m  as calculated from the following:    Height as of 12/21/24: 1.6 m (5' 3\").    Weight as of this encounter: 99.2 kg (218 lb 12.8 oz).  Medication Review: complete    The next two questions are to help us understand your food security.  If you are feeling you need any assistance in this area, we have resources available to support you today.          7/23/2025   SDOH- Food Insecurity   Within the past 12 months, did you worry that your food would run out before you got money to buy more? N   Within the past 12 months, did the food you bought just not last and you didn t have money to get more? N         Health Care Directive:  Patient does not have a Health Care Directive: Discussed advance care planning with patient; however, patient declined at this time.    Taylor Hammer      "

## 2025-07-23 NOTE — PATIENT INSTRUCTIONS
Medication:   Continue Luvox CR to 200mg at bed  Continue Vraylar 3mg at bed  Added Klonopin 1mg, 1/2-1 tab daily as needed for social anxiety. Instructed to only use when needed.     Psychotherapy: None  Labs: No labs today  F/U: 1 month
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
## 2025-07-31 DIAGNOSIS — R25.1 TREMOR: Primary | ICD-10-CM

## 2025-07-31 RX ORDER — PROPRANOLOL HYDROCHLORIDE 10 MG/1
10 TABLET ORAL 2 TIMES DAILY
Qty: 60 TABLET | Refills: 1 | Status: SHIPPED | OUTPATIENT
Start: 2025-07-31

## 2025-08-13 ENCOUNTER — TELEPHONE (OUTPATIENT)
Dept: PSYCHIATRY | Facility: OTHER | Age: 25
End: 2025-08-13
Payer: COMMERCIAL

## 2025-08-14 ENCOUNTER — TELEPHONE (OUTPATIENT)
Dept: PSYCHIATRY | Facility: OTHER | Age: 25
End: 2025-08-14
Payer: COMMERCIAL

## 2025-08-15 ENCOUNTER — HOSPITAL ENCOUNTER (EMERGENCY)
Facility: OTHER | Age: 25
Discharge: HOME | End: 2025-08-15
Attending: STUDENT IN AN ORGANIZED HEALTH CARE EDUCATION/TRAINING PROGRAM
Payer: COMMERCIAL

## 2025-08-15 VITALS
DIASTOLIC BLOOD PRESSURE: 77 MMHG | HEIGHT: 63 IN | WEIGHT: 220 LBS | OXYGEN SATURATION: 99 % | SYSTOLIC BLOOD PRESSURE: 109 MMHG | BODY MASS INDEX: 38.98 KG/M2 | RESPIRATION RATE: 17 BRPM | HEART RATE: 94 BPM

## 2025-08-15 DIAGNOSIS — I10 ELEVATED BLOOD PRESSURE READING WITH DIAGNOSIS OF HYPERTENSION: Primary | ICD-10-CM

## 2025-08-15 DIAGNOSIS — F41.9 ANXIETY: ICD-10-CM

## 2025-08-15 PROCEDURE — 99284 EMERGENCY DEPT VISIT MOD MDM: CPT | Performed by: STUDENT IN AN ORGANIZED HEALTH CARE EDUCATION/TRAINING PROGRAM

## 2025-08-15 PROCEDURE — 99282 EMERGENCY DEPT VISIT SF MDM: CPT | Performed by: STUDENT IN AN ORGANIZED HEALTH CARE EDUCATION/TRAINING PROGRAM

## 2025-08-15 ASSESSMENT — ACTIVITIES OF DAILY LIVING (ADL): ADLS_ACUITY_SCORE: 41

## (undated) RX ORDER — ONDANSETRON 2 MG/ML
INJECTION INTRAMUSCULAR; INTRAVENOUS
Status: DISPENSED
Start: 2023-01-06

## (undated) RX ORDER — ACETAMINOPHEN 500 MG
TABLET ORAL
Status: DISPENSED
Start: 2018-12-16

## (undated) RX ORDER — LORAZEPAM 1 MG/1
TABLET ORAL
Status: DISPENSED
Start: 2018-04-01

## (undated) RX ORDER — SODIUM CHLORIDE 9 MG/ML
INJECTION, SOLUTION INTRAVENOUS
Status: DISPENSED
Start: 2019-11-09

## (undated) RX ORDER — SODIUM CHLORIDE 9 MG/ML
INJECTION, SOLUTION INTRAVENOUS
Status: DISPENSED
Start: 2022-03-07